# Patient Record
Sex: MALE | Race: WHITE | NOT HISPANIC OR LATINO | Employment: FULL TIME | URBAN - METROPOLITAN AREA
[De-identification: names, ages, dates, MRNs, and addresses within clinical notes are randomized per-mention and may not be internally consistent; named-entity substitution may affect disease eponyms.]

---

## 2022-05-09 ENCOUNTER — OFFICE VISIT (OUTPATIENT)
Dept: FAMILY MEDICINE CLINIC | Facility: CLINIC | Age: 27
End: 2022-05-09

## 2022-05-09 ENCOUNTER — LAB (OUTPATIENT)
Dept: LAB | Facility: HOSPITAL | Age: 27
End: 2022-05-09

## 2022-05-09 VITALS
SYSTOLIC BLOOD PRESSURE: 111 MMHG | TEMPERATURE: 98 F | HEART RATE: 65 BPM | HEIGHT: 70 IN | BODY MASS INDEX: 30.24 KG/M2 | DIASTOLIC BLOOD PRESSURE: 70 MMHG | OXYGEN SATURATION: 98 % | WEIGHT: 211.2 LBS

## 2022-05-09 DIAGNOSIS — Z13.6 ENCOUNTER FOR LIPID SCREENING FOR CARDIOVASCULAR DISEASE: ICD-10-CM

## 2022-05-09 DIAGNOSIS — Z11.59 NEED FOR HEPATITIS C SCREENING TEST: ICD-10-CM

## 2022-05-09 DIAGNOSIS — Z13.220 ENCOUNTER FOR LIPID SCREENING FOR CARDIOVASCULAR DISEASE: ICD-10-CM

## 2022-05-09 DIAGNOSIS — Z76.89 ESTABLISHING CARE WITH NEW DOCTOR, ENCOUNTER FOR: Primary | ICD-10-CM

## 2022-05-09 DIAGNOSIS — Z76.89 ESTABLISHING CARE WITH NEW DOCTOR, ENCOUNTER FOR: ICD-10-CM

## 2022-05-09 DIAGNOSIS — Z13.29 SCREENING FOR THYROID DISORDER: ICD-10-CM

## 2022-05-09 DIAGNOSIS — F41.9 ANXIETY: ICD-10-CM

## 2022-05-09 DIAGNOSIS — R07.9 CHEST PAIN, UNSPECIFIED TYPE: ICD-10-CM

## 2022-05-09 DIAGNOSIS — F32.A DEPRESSION, UNSPECIFIED DEPRESSION TYPE: ICD-10-CM

## 2022-05-09 LAB
ALBUMIN SERPL-MCNC: 5 G/DL (ref 3.5–5.2)
ALBUMIN/GLOB SERPL: 2.3 G/DL
ALP SERPL-CCNC: 86 U/L (ref 39–117)
ALT SERPL W P-5'-P-CCNC: 48 U/L (ref 1–41)
ANION GAP SERPL CALCULATED.3IONS-SCNC: 10.8 MMOL/L (ref 5–15)
AST SERPL-CCNC: 25 U/L (ref 1–40)
BASOPHILS # BLD AUTO: 0.04 10*3/MM3 (ref 0–0.2)
BASOPHILS NFR BLD AUTO: 0.9 % (ref 0–1.5)
BILIRUB SERPL-MCNC: 0.7 MG/DL (ref 0–1.2)
BUN SERPL-MCNC: 11 MG/DL (ref 6–20)
BUN/CREAT SERPL: 12 (ref 7–25)
CALCIUM SPEC-SCNC: 10 MG/DL (ref 8.6–10.5)
CHLORIDE SERPL-SCNC: 104 MMOL/L (ref 98–107)
CHOLEST SERPL-MCNC: 172 MG/DL (ref 0–200)
CO2 SERPL-SCNC: 25.2 MMOL/L (ref 22–29)
CREAT SERPL-MCNC: 0.92 MG/DL (ref 0.76–1.27)
DEPRECATED RDW RBC AUTO: 42 FL (ref 37–54)
EGFRCR SERPLBLD CKD-EPI 2021: 117.7 ML/MIN/1.73
EOSINOPHIL # BLD AUTO: 0.09 10*3/MM3 (ref 0–0.4)
EOSINOPHIL NFR BLD AUTO: 2 % (ref 0.3–6.2)
ERYTHROCYTE [DISTWIDTH] IN BLOOD BY AUTOMATED COUNT: 13 % (ref 12.3–15.4)
GLOBULIN UR ELPH-MCNC: 2.2 GM/DL
GLUCOSE SERPL-MCNC: 95 MG/DL (ref 65–99)
HCT VFR BLD AUTO: 49.6 % (ref 37.5–51)
HCV AB SER DONR QL: NORMAL
HDLC SERPL-MCNC: 39 MG/DL (ref 40–60)
HGB BLD-MCNC: 17.2 G/DL (ref 13–17.7)
IMM GRANULOCYTES # BLD AUTO: 0.01 10*3/MM3 (ref 0–0.05)
IMM GRANULOCYTES NFR BLD AUTO: 0.2 % (ref 0–0.5)
LDLC SERPL CALC-MCNC: 110 MG/DL (ref 0–100)
LDLC/HDLC SERPL: 2.75 {RATIO}
LYMPHOCYTES # BLD AUTO: 1.47 10*3/MM3 (ref 0.7–3.1)
LYMPHOCYTES NFR BLD AUTO: 32.4 % (ref 19.6–45.3)
MCH RBC QN AUTO: 30.7 PG (ref 26.6–33)
MCHC RBC AUTO-ENTMCNC: 34.7 G/DL (ref 31.5–35.7)
MCV RBC AUTO: 88.6 FL (ref 79–97)
MONOCYTES # BLD AUTO: 0.41 10*3/MM3 (ref 0.1–0.9)
MONOCYTES NFR BLD AUTO: 9 % (ref 5–12)
NEUTROPHILS NFR BLD AUTO: 2.52 10*3/MM3 (ref 1.7–7)
NEUTROPHILS NFR BLD AUTO: 55.5 % (ref 42.7–76)
NRBC BLD AUTO-RTO: 0 /100 WBC (ref 0–0.2)
PLATELET # BLD AUTO: 252 10*3/MM3 (ref 140–450)
PMV BLD AUTO: 10.9 FL (ref 6–12)
POTASSIUM SERPL-SCNC: 4.7 MMOL/L (ref 3.5–5.2)
PROT SERPL-MCNC: 7.2 G/DL (ref 6–8.5)
RBC # BLD AUTO: 5.6 10*6/MM3 (ref 4.14–5.8)
SODIUM SERPL-SCNC: 140 MMOL/L (ref 136–145)
TRIGL SERPL-MCNC: 129 MG/DL (ref 0–150)
TSH SERPL DL<=0.05 MIU/L-ACNC: 2.42 UIU/ML (ref 0.27–4.2)
VLDLC SERPL-MCNC: 23 MG/DL (ref 5–40)
WBC NRBC COR # BLD: 4.54 10*3/MM3 (ref 3.4–10.8)

## 2022-05-09 PROCEDURE — 86803 HEPATITIS C AB TEST: CPT

## 2022-05-09 PROCEDURE — 93000 ELECTROCARDIOGRAM COMPLETE: CPT | Performed by: NURSE PRACTITIONER

## 2022-05-09 PROCEDURE — 80061 LIPID PANEL: CPT

## 2022-05-09 PROCEDURE — 99204 OFFICE O/P NEW MOD 45 MIN: CPT | Performed by: NURSE PRACTITIONER

## 2022-05-09 PROCEDURE — 80050 GENERAL HEALTH PANEL: CPT

## 2022-05-09 PROCEDURE — 36415 COLL VENOUS BLD VENIPUNCTURE: CPT

## 2022-05-09 RX ORDER — SERTRALINE HYDROCHLORIDE 25 MG/1
25 TABLET, FILM COATED ORAL DAILY
Qty: 30 TABLET | Refills: 1 | Status: SHIPPED | OUTPATIENT
Start: 2022-05-09 | End: 2022-10-19 | Stop reason: DRUGHIGH
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
## 2022-05-23 ENCOUNTER — APPOINTMENT (OUTPATIENT)
Dept: CARDIOLOGY | Facility: HOSPITAL | Age: 27
End: 2022-05-23

## 2022-05-24 ENCOUNTER — OFFICE VISIT (OUTPATIENT)
Dept: FAMILY MEDICINE CLINIC | Facility: CLINIC | Age: 27
End: 2022-05-24

## 2022-05-24 ENCOUNTER — HOSPITAL ENCOUNTER (OUTPATIENT)
Dept: GENERAL RADIOLOGY | Facility: HOSPITAL | Age: 27
Discharge: HOME | End: 2022-05-24
Admitting: NURSE PRACTITIONER

## 2022-05-24 VITALS
HEIGHT: 70 IN | SYSTOLIC BLOOD PRESSURE: 135 MMHG | DIASTOLIC BLOOD PRESSURE: 74 MMHG | WEIGHT: 208 LBS | OXYGEN SATURATION: 99 % | HEART RATE: 84 BPM | BODY MASS INDEX: 29.78 KG/M2

## 2022-05-24 DIAGNOSIS — M54.50 MIDLINE LOW BACK PAIN WITHOUT SCIATICA, UNSPECIFIED CHRONICITY: ICD-10-CM

## 2022-05-24 DIAGNOSIS — M54.50 MIDLINE LOW BACK PAIN WITHOUT SCIATICA, UNSPECIFIED CHRONICITY: Primary | ICD-10-CM

## 2022-05-24 PROCEDURE — 72110 X-RAY EXAM L-2 SPINE 4/>VWS: CPT

## 2022-05-24 PROCEDURE — 99213 OFFICE O/P EST LOW 20 MIN: CPT | Performed by: NURSE PRACTITIONER

## 2022-05-24 PROCEDURE — 96372 THER/PROPH/DIAG INJ SC/IM: CPT | Performed by: NURSE PRACTITIONER

## 2022-05-24 RX ORDER — MELOXICAM 15 MG/1
15 TABLET ORAL DAILY
Qty: 30 TABLET | Refills: 0 | Status: SHIPPED | OUTPATIENT
Start: 2022-05-24 | End: 2022-09-26

## 2022-05-24 RX ORDER — KETOROLAC TROMETHAMINE 30 MG/ML
60 INJECTION, SOLUTION INTRAMUSCULAR; INTRAVENOUS ONCE
Status: COMPLETED | OUTPATIENT
Start: 2022-05-24 | End: 2022-05-24

## 2022-05-24 RX ORDER — TRIAMCINOLONE ACETONIDE 40 MG/ML
40 INJECTION, SUSPENSION INTRA-ARTICULAR; INTRAMUSCULAR ONCE
Status: COMPLETED | OUTPATIENT
Start: 2022-05-24 | End: 2022-05-24

## 2022-05-24 RX ADMIN — TRIAMCINOLONE ACETONIDE 40 MG: 40 INJECTION, SUSPENSION INTRA-ARTICULAR; INTRAMUSCULAR at 09:44

## 2022-05-24 RX ADMIN — KETOROLAC TROMETHAMINE 60 MG: 30 INJECTION, SOLUTION INTRAMUSCULAR; INTRAVENOUS at 09:45

## 2022-05-24 NOTE — PROGRESS NOTES
"Chief Complaint  Low Back Pain    Subjective          Michael Horowitz presents to [facility]  Back Pain  This is a recurrent problem. The current episode started in the past 7 days. The problem occurs constantly. The problem is unchanged. The pain is present in the lumbar spine. The quality of the pain is described as burning and aching. The pain does not radiate. The pain is at a severity of 8/10. The pain is the same all the time. The symptoms are aggravated by sitting, standing and bending. He has tried analgesics and muscle relaxant for the symptoms. The treatment provided no relief.       Objective   Vital Signs:  /74   Pulse 84   Ht 177.8 cm (70\")   Wt 94.3 kg (208 lb)   SpO2 99%   BMI 29.84 kg/m²   BMI is >= 25 and < 30. (Overweight) The following options were offered after discussion: nutrition counseling/recommendations    m  Physical Exam  Vitals reviewed.   Constitutional:       Appearance: Normal appearance. He is well-developed.   HENT:      Head: Normocephalic and atraumatic.      Right Ear: External ear normal.      Left Ear: External ear normal.      Mouth/Throat:      Pharynx: No oropharyngeal exudate.   Eyes:      Conjunctiva/sclera: Conjunctivae normal.      Pupils: Pupils are equal, round, and reactive to light.   Cardiovascular:      Rate and Rhythm: Normal rate and regular rhythm.      Heart sounds: No murmur heard.    No friction rub. No gallop.   Pulmonary:      Effort: Pulmonary effort is normal.      Breath sounds: Normal breath sounds. No wheezing or rhonchi.   Musculoskeletal:      Lumbar back: Tenderness and bony tenderness present. Positive right straight leg raise test and positive left straight leg raise test.   Skin:     General: Skin is warm and dry.   Neurological:      Mental Status: He is alert and oriented to person, place, and time.      Cranial Nerves: No cranial nerve deficit.   Psychiatric:         Mood and Affect: Mood and affect " normal.         Behavior: Behavior normal.         Thought Content: Thought content normal.         Judgment: Judgment normal.        Result Review :                 Assessment and Plan    Diagnoses and all orders for this visit:    1. Midline low back pain without sciatica, unspecified chronicity (Primary)  Comments:  trial mobic 15 mg daily, side effects and administration addressed, no additional NSAIDS  Orders:  -     meloxicam (MOBIC) 15 MG tablet; Take 1 tablet by mouth Daily.  Dispense: 30 tablet; Refill: 0  -     triamcinolone acetonide (KENALOG-40) injection 40 mg  -     ketorolac (TORADOL) injection 60 mg  -     XR Spine Lumbar 4+ View; Future             Follow Up   Return if symptoms worsen or fail to improve.  Patient was given instructions and counseling regarding his condition or for health maintenance advice. Please see specific information pulled into the AVS if appropriate.

## 2022-06-02 ENCOUNTER — TRANSCRIBE ORDERS (OUTPATIENT)
Dept: PHYSICAL THERAPY | Facility: CLINIC | Age: 27
End: 2022-06-02

## 2022-06-02 DIAGNOSIS — S39.012A STRAIN OF LUMBAR REGION, INITIAL ENCOUNTER: Primary | ICD-10-CM

## 2022-06-03 ENCOUNTER — TREATMENT (OUTPATIENT)
Dept: PHYSICAL THERAPY | Facility: CLINIC | Age: 27
End: 2022-06-03

## 2022-06-03 DIAGNOSIS — M54.50 LOW BACK PAIN, UNSPECIFIED BACK PAIN LATERALITY, UNSPECIFIED CHRONICITY, UNSPECIFIED WHETHER SCIATICA PRESENT: Primary | ICD-10-CM

## 2022-06-03 PROCEDURE — 97110 THERAPEUTIC EXERCISES: CPT | Performed by: PHYSICAL THERAPIST

## 2022-06-03 PROCEDURE — 97161 PT EVAL LOW COMPLEX 20 MIN: CPT | Performed by: PHYSICAL THERAPIST

## 2022-06-03 NOTE — PROGRESS NOTES
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 He was bent over and twisting. He felt immediate pain, difficulty moving, had pain running down his left leg into his shin/calf. He is moving better now overall, but still has the stiffness and soreness in both sides of his lower back and mainly the left hip. Has tried heat, muscle relaxers, but neither help. 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      Precautions and Work Restrictions: currently on light dutyPain  Current pain ratin  At best pain ratin  At worst pain ratin  Quality: radiating, tight and dull ache  Relieving factors: change in position  Aggravating factors: prolonged positioning and repetitive movement    Social Support  Lives in: multiple-level home  Lives with: young children    Diagnostic Tests  X-ray: normal    Patient Goals  Patient goals for therapy: decreased pain, increased strength, return to sport/leisure activities and increased motion             Objective          Neurological Testing     Sensation     Lumbar   Left   Intact: light touch    Right   Intact: light touch    Active Range of Motion     Lumbar   Flexion: 90 degrees   Extension: 15 degrees   Left lateral flexion: 25 degrees   Right lateral flexion: 25 degrees     Strength/Myotome  Testing     Lumbar     Right   Normal strength    Left Hip   Planes of Motion   Flexion: 4+  Extension: 4  Abduction: 4  Adduction: 5    Tests       Thoracic   Negative slump.     Lumbar     Left   Negative crossed SLR.     Right   Negative crossed SLR.     Additional Tests Details  Repeated extension - stiffness, no changes in LE symptoms (inconclusive)    Repeated flexion - no change in LE symptoms (inconclusive)      See Exercise, Manual, and Modality Logs for complete treatment.     Assessment & Plan     Assessment  Impairments: abnormal muscle firing, abnormal or restricted ROM, activity intolerance, impaired physical strength and pain with function  Functional Limitations: carrying objects, lifting, walking, uncomfortable because of pain, sitting and standing  Assessment details: The patient presents to physical therapy with complaints of low back pain. The patient presents with associated left lower extremity weakness, lumbar stiffness, and functional deficits (OSWESTRY). The patient would benefit from skilled PT intervention to address the above mentioned functional limitations. Inconclusive with repeated movement testing.     Prognosis: good    Goals  Plan Goals: LOW BACK PROBLEMS:    1. The patient complains of low back pain.  LTG 1: 12 weeks:  The patient will report a pain rating of 3/10 or better at worst in order to improve  tolerance to activities of daily living and improve sleep quality.  STATUS:  New  STG 1a: 6 weeks:  The patient will report a pain rating of 5/10 or better at its worst.  STATUS:  New  TREATMENT:  Therapeutic exercises, manual therapy, aquatic therapy, home exercise   instruction, and modalities as needed for pain to include:  electrical stimulation, moist heat, ice,   ultrasound, and diathermy.      2. The patient demonstrates weakness of the left hip.  LTG 2: 12 weeks:  The patient will demonstrate 5 /5 strength for left hip flexion, abduction,  and extension in order to improve  hip stability.  STATUS:  New  STG 2a: 6 weeks:  The patient will demonstrate 4+ /5 strength for left hip flexion, abduction,  and extension.  STATUS:  New  TREATMENT: Therapeutic exercises, manual therapy, aquatic therapy, home exercise instruction,  and modalities as needed for pain to include:  electrical stimulation, moist heat, ice, ultrasound, and   diathermy.      4. The patient has limited lumbar AROM  LTG 4: 12 weeks:  The patient will demonstrate lumbar AROM as follows: 25 degrees of extension.  STATUS:  New  STG 4a: The patient will be independent with HEP.    STATUS:  New  TREATMENT: Manual therapy, therapeutic exercise, home exercise instruction, and modalities as needed to include: moist heat, electrical stimulation, and ultrasound.      5. Mobility: Walking/Moving Around Functional Limitation    LTG 5: 12 weeks:  The patient will demonstrate 1-19 % limitation by achieving a score of 1-9 on the DONTRELL.  STATUS:  New  TREATMENT:  Manual therapy, therapeutic exercise, home exercise instruction, and modalities as needed to include: moist heat, electrical stimulation, and ultrasound.         Plan  Therapy options: will be seen for skilled therapy services  Planned modality interventions: TENS, cryotherapy, thermotherapy (hydrocollator packs), traction and dry needling  Planned therapy interventions: manual therapy, stretching, strengthening, therapeutic activities, neuromuscular re-education, home exercise program, joint mobilization, functional ROM exercises, soft tissue mobilization, spinal/joint mobilization, flexibility and gait training  Frequency: 3x week  Duration in weeks: 12  Treatment plan discussed with: patient        Visit Diagnoses:    ICD-10-CM ICD-9-CM   1. Low back pain, unspecified back pain laterality, unspecified chronicity, unspecified whether sciatica present  M54.50 724.2       History # of Personal Factors and/or Comorbidities: LOW (0)  Examination of Body System(s): # of elements:  MODERATE (3)  Clinical Presentation: STABLE   Clinical Decision Making: LOW       Timed:         Manual Therapy:    0     mins  75021;     Therapeutic Exercise:    15     mins  25091;     Neuromuscular Jose Luis:    0    mins  23057;    Therapeutic Activity:     0     mins  64195;     Gait Trainin     mins  27516;     Ultrasound:     0     mins  94181;    Ionto                               0    mins   96901  Self Care                       0     mins   70086  Canalith Repos    0     mins 28986      Un-Timed:  Electrical Stimulation:    0     mins  43760 (MC );  Dry Needling     0     mins self-pay  Traction     0     mins 27037  Low Eval     30     Mins  09047  Mod Eval     0     Mins  50193  High Eval                       0     Mins  82204  Re-Eval                           0    mins  75672    Timed Treatment:   15   mins   Total Treatment:     45   mins    PT SIGNATURE: Nader Whitten PT     Electronically signed 6/3/2022    [place] License: PT - [id number]     Initial Certification  Certification Period: 6/3/2022 thru 2022  I certify that the therapy services are furnished while this patient is under my care.  The services outlined above are required by this patient, and will be reviewed every 90 days.     PHYSICIAN: Duong Bazan MD   NPI: [id number]                                        DATE:     Please sign and return via fax to [phone]. Thank you, [facility].

## 2022-06-21 ENCOUNTER — TREATMENT (OUTPATIENT)
Dept: PHYSICAL THERAPY | Facility: CLINIC | Age: 27
End: 2022-06-21

## 2022-06-21 DIAGNOSIS — M54.50 LOW BACK PAIN, UNSPECIFIED BACK PAIN LATERALITY, UNSPECIFIED CHRONICITY, UNSPECIFIED WHETHER SCIATICA PRESENT: Primary | ICD-10-CM

## 2022-06-21 PROCEDURE — 97110 THERAPEUTIC EXERCISES: CPT | Performed by: PHYSICAL THERAPIST

## 2022-06-21 PROCEDURE — 97140 MANUAL THERAPY 1/> REGIONS: CPT | Performed by: PHYSICAL THERAPIST

## 2022-06-21 NOTE — PROGRESS NOTES
Physical Therapy Daily Treatment Note      Patient: Michael Horowitz   : 1995  Referring practitioner: Duong Bazan MD  Date of Initial Visit: Type: THERAPY  Noted: 6/3/2022  Today's Date: 2022  Patient seen for 2 sessions           Subjective  Michael Horowitz reports: his pain rates 6 /10 at start of care. Michael indicated his pain is in the back and extends into R anterolateral thigh.        Objective   See Exercise, Manual, and Modality Logs for complete treatment.       Assessment/Plan  Spontaneous lumbar mobilization occurred during L ilium distraction while R side lying. Michael noted that he felt this and it did change his lower back discomfort but not the referred sensation in his anterolateral R thigh. No change in pain rating after session. Skilled care remains necessary to aid pain relief, aid flexibility and strength to return to PLOF.    Visit Diagnoses:    ICD-10-CM ICD-9-CM   1. Low back pain, unspecified back pain laterality, unspecified chronicity, unspecified whether sciatica present  M54.50 724.2       Progress per Plan of Care and Progress strengthening /stabilization /functional activity           Timed:  Manual Therapy:    18     mins  76195;  Therapeutic Exercise:     9    mins  01724;     Neuromuscular Jose Luis:        mins  25353;    Therapeutic Activity:          mins  93814;     Gait Training:           mins  81531;     Ultrasound:          mins  30723;    Electrical Stimulation:         mins  17283 ( );  Aquatics  __   mins   89624    Untimed:  Electrical Stimulation:         mins  97209 ( );  Mechanical Traction:         mins  43475;     Timed Treatment:   27   mins   Total Treatment:     27   mins    Electronically Signed:  Elizabeth Dupree PTA  Physical Therapist Assistant    [place] \[facility]\"" license [id number]

## 2022-06-22 DIAGNOSIS — F32.A DEPRESSION, UNSPECIFIED DEPRESSION TYPE: ICD-10-CM

## 2022-06-22 DIAGNOSIS — F41.9 ANXIETY: ICD-10-CM

## 2022-06-23 ENCOUNTER — TREATMENT (OUTPATIENT)
Dept: PHYSICAL THERAPY | Facility: CLINIC | Age: 27
End: 2022-06-23

## 2022-06-23 DIAGNOSIS — M54.50 LOW BACK PAIN, UNSPECIFIED BACK PAIN LATERALITY, UNSPECIFIED CHRONICITY, UNSPECIFIED WHETHER SCIATICA PRESENT: Primary | ICD-10-CM

## 2022-06-23 PROCEDURE — 97110 THERAPEUTIC EXERCISES: CPT | Performed by: PHYSICAL THERAPIST

## 2022-06-23 PROCEDURE — 97140 MANUAL THERAPY 1/> REGIONS: CPT | Performed by: PHYSICAL THERAPIST

## 2022-06-23 RX ORDER — SERTRALINE HYDROCHLORIDE 25 MG/1
25 TABLET, FILM COATED ORAL DAILY
Qty: 30 TABLET | Refills: 1 | OUTPATIENT
Start: 2022-06-23

## 2022-06-23 NOTE — PROGRESS NOTES
"Physical Therapy Daily Treatment Note      Patient: Michael Horowitz   : 1995  Referring practitioner: Duong Bazan MD  Date of Initial Visit: Type: THERAPY  Noted: 6/3/2022  Today's Date: 2022  Patient seen for 3 sessions           Subjective  Michael Horowitz reports: paih at 6/10 at arrival. Michael commented that he is feeling an ache that he indicated from L SI down buttocks into lateral thigh down to fibular head now. \"It feels like the leg is numb, it is inside, don't know how to explain it.\"     \"Have my MRI next Wednesday.\" The date of MRI is 22.    Objective   See Exercise, Manual, and Modality Logs for complete treatment.       Assessment/Plan  Varied relief and then increase in tightness at low back reported during manual work. Then during prone extension he c/o worsening numbness so this was ceased. Further skilled care required to attend to AROM, strength, and functional gains.    Visit Diagnoses:    ICD-10-CM ICD-9-CM   1. Low back pain, unspecified back pain laterality, unspecified chronicity, unspecified whether sciatica present  M54.50 724.2       Progress per Plan of Care and Progress strengthening /stabilization /functional activity           Timed:  Manual Therapy:    24     mins  63261;  Therapeutic Exercise:    8     mins  63582;     Neuromuscular Jose Luis:        mins  98791;    Therapeutic Activity:          mins  34345;     Gait Training:           mins  38383;     Ultrasound:          mins  73167;    Electrical Stimulation:         mins  85432 ( );  Aquatics  __   mins   65542    Untimed:  Electrical Stimulation:         mins  31774 ( );  Mechanical Traction:         mins  91370;     Timed Treatment:   32   mins   Total Treatment:     32   mins    Electronically Signed:  Elizabeth Dupree PTA  Physical Therapist Assistant    [place] PTA license [id number]            "

## 2022-06-28 ENCOUNTER — TREATMENT (OUTPATIENT)
Dept: PHYSICAL THERAPY | Facility: CLINIC | Age: 27
End: 2022-06-28

## 2022-06-28 DIAGNOSIS — M54.50 LOW BACK PAIN, UNSPECIFIED BACK PAIN LATERALITY, UNSPECIFIED CHRONICITY, UNSPECIFIED WHETHER SCIATICA PRESENT: Primary | ICD-10-CM

## 2022-06-28 PROCEDURE — 97110 THERAPEUTIC EXERCISES: CPT | Performed by: PHYSICAL THERAPIST

## 2022-06-28 PROCEDURE — 97140 MANUAL THERAPY 1/> REGIONS: CPT | Performed by: PHYSICAL THERAPIST

## 2022-06-28 NOTE — PROGRESS NOTES
Physical Therapy Daily Treatment Note      Patient: Michael Horowitz   : 1995  Referring practitioner: Duong Bazan MD  Date of Initial Visit: Type: THERAPY  Noted: 6/3/2022  Today's Date: 2022  Patient seen for 4 sessions           Subjective Questionnaire:       Subjective Evaluation    History of Present Illness    Subjective comment: Pt reported 6/10 pain and is moving better than he did initially.  Pt reported he is having trouble sleeping secondary to pain.Pain  Current pain ratin           Objective   See Exercise, Manual, and Modality Logs for complete treatment.       Assessment & Plan     Assessment    Assessment details: Pt tolerated therapeutic exercises well and reported slight relief with manual.  Continue with POC to return pt to regular duty at work without back pain.        Visit Diagnoses:    ICD-10-CM ICD-9-CM   1. Low back pain, unspecified back pain laterality, unspecified chronicity, unspecified whether sciatica present  M54.50 724.2       Progress per Plan of Care and Progress strengthening /stabilization /functional activity           Timed:  Manual Therapy:    10     mins  35975;  Therapeutic Exercise:    19     mins  21770;     Neuromuscular Jose Luis:        mins  32425;    Therapeutic Activity:          mins  33596;     Gait Training:           mins  68789;     Ultrasound:          mins  59628;    Electrical Stimulation:         mins  20015 ( );  Aquatic Therapy          mins  81315    Untimed:  Electrical Stimulation:         mins  72856 ( );  Mechanical Traction:         mins  88426;     Timed Treatment:   29   mins   Total Treatment:     29   mins    Electronically signed    Lyly Mckeon PTA  Physical Therapist Assistant    [facility] license: [id number]

## 2022-07-01 ENCOUNTER — TREATMENT (OUTPATIENT)
Dept: PHYSICAL THERAPY | Facility: CLINIC | Age: 27
End: 2022-07-01

## 2022-07-01 DIAGNOSIS — M54.50 LOW BACK PAIN, UNSPECIFIED BACK PAIN LATERALITY, UNSPECIFIED CHRONICITY, UNSPECIFIED WHETHER SCIATICA PRESENT: Primary | ICD-10-CM

## 2022-07-01 PROCEDURE — 97140 MANUAL THERAPY 1/> REGIONS: CPT | Performed by: PHYSICAL THERAPIST

## 2022-07-01 PROCEDURE — 97110 THERAPEUTIC EXERCISES: CPT | Performed by: PHYSICAL THERAPIST

## 2022-07-01 NOTE — PROGRESS NOTES
"Progress Assessment        Patient: Michael Horowitz   : 1995  Diagnosis/ICD-10 Code:  Low back pain, unspecified back pain laterality, unspecified chronicity, unspecified whether sciatica present [M54.50]  Referring practitioner: Duong Bazan MD  Date of Initial Visit: Type: THERAPY  Noted: 6/3/2022  Today's Date: 2022  Patient seen for 5 sessions      Subjective:     Subjective Questionnaire: Oswestry:  = 40% limitation  Clinical Progress: improved  Home Program Compliance: Yes  Treatment has included: therapeutic exercise, manual therapy and therapeutic activity    Subjective Evaluation    History of Present Illness  Mechanism of injury: Pt reporting he is \"a little better\" but still having that same pain on the left side of the lower back. And he is having more pain shooting down the leg, to the knee, and into the bottom of the foot and also numbness in the foot. He had an MRI earlier this week, but doesn't know the results yet, he has a follow up with his physician later today.    Pain  Current pain ratin  At worst pain ratin  Quality: radiating         Objective     Neurological Testing      Sensation      Lumbar   Left   Intact: light touch     Right   Intact: light touch     Active Range of Motion      Lumbar   Flexion: 60 degrees *pain left side  Extension: 25 degrees *pain left side  Left lateral flexion: 24 degrees *pain left side  Right lateral flexion: 30 degrees      Strength/Myotome Testing      Lumbar      Right   Normal strength     Left Hip   Planes of Motion   Flexion: 4+  Extension: 4+  Abduction: 4+  Adduction: 4     Tests       Positive Slump test on left with low back pain reported          Assessment & Plan     Assessment  Impairments: abnormal muscle firing, abnormal or restricted ROM, activity intolerance, impaired physical strength and pain with function  Functional Limitations: carrying objects, lifting, walking, uncomfortable because of pain, sitting and " standing  Assessment details: The patient presents to physical therapy with complaints of low back pain, he is still experiencing pain and symptoms radiating down the left leg. He has improved his extension ROM, however, more limited with flexion. He did have an MRI earlier this week which may give more indication of his injury. The patient presents with associated left lower extremity weakness, lumbar stiffness, and functional deficits (OSWESTRY). The patient would benefit from ongoing skilled PT intervention to address the above mentioned functional limitations. Inconclusive with repeated movement testing.     Prognosis: good    Goals  Plan Goals: LOW BACK PROBLEMS:    1. The patient complains of low back pain.  LTG 1: 12 weeks:  The patient will report a pain rating of 3/10 or better at worst in order to improve  tolerance to activities of daily living and improve sleep quality.  STATUS:  Not met  STG 1a: 6 weeks:  The patient will report a pain rating of 5/10 or better at its worst.  STATUS:  Not met  TREATMENT:  Therapeutic exercises, manual therapy, aquatic therapy, home exercise   instruction, and modalities as needed for pain to include:  electrical stimulation, moist heat, ice,   ultrasound, and diathermy.      2. The patient demonstrates weakness of the left hip.  LTG 2: 12 weeks:  The patient will demonstrate 5 /5 strength for left hip flexion, abduction,  and extension in order to improve hip stability.  STATUS:  Not met  STG 2a: 6 weeks:  The patient will demonstrate 4+ /5 strength for left hip flexion, abduction,  and extension.  STATUS:  Not met  TREATMENT: Therapeutic exercises, manual therapy, aquatic therapy, home exercise instruction,  and modalities as needed for pain to include:  electrical stimulation, moist heat, ice, ultrasound, and   diathermy.      4. The patient has limited lumbar AROM  LTG 4: 12 weeks:  The patient will demonstrate lumbar AROM as follows: 25 degrees of extension.  STATUS:  MET  STG 4a: The patient will be independent with HEP.    STATUS:  Ongoing  TREATMENT: Manual therapy, therapeutic exercise, home exercise instruction, and modalities as needed to include: moist heat, electrical stimulation, and ultrasound.      5. Mobility: Walking/Moving Around Functional Limitation    LTG 5: 12 weeks:  The patient will demonstrate 1-19 % limitation by achieving a score of 1-9 on the DONTRELL.  STATUS:  New  TREATMENT:  Manual therapy, therapeutic exercise, home exercise instruction, and modalities as needed to include: moist heat, electrical stimulation, and ultrasound.         Plan  Therapy options: will be seen for skilled therapy services  Planned modality interventions: TENS, cryotherapy, thermotherapy (hydrocollator packs), traction and dry needling  Planned therapy interventions: manual therapy, stretching, strengthening, therapeutic activities, neuromuscular re-education, home exercise program, joint mobilization, functional ROM exercises, soft tissue mobilization, spinal/joint mobilization, flexibility and gait training  Frequency: 3x week  Duration in weeks: 12  Treatment plan discussed with: patient      Progress toward previous goals: Partially Met    See Exercise, Manual, and Modality Logs for complete treatment.         Recommendations: Continue as planned  Timeframe: 2 months  Prognosis to achieve goals: good    PT Signature: Nader Whitten PT    Electronically signed 7/1/2022    [place] License: PT - [id number]     Based upon review of the patient's progress and continued therapy plan, it is my medical opinion that Michael Horowitz should continue physical therapy treatment at [facility]  [address]  [place] [address] [phone].      Timed:         Manual Therapy:    24     mins  15843;     Therapeutic Exercise:    10     mins  52978;     Neuromuscular Jose Luis:    0    mins  19547;    Therapeutic Activity:     0     mins  17984;     Gait  Trainin     mins  06521;     Ultrasound:     0     mins  16579;    Ionto                               0    mins   71861  Self Care                       0     mins   28060  Aquatic                          0     mins 23258            Timed Treatment:   34   mins   Total Treatment:     34   mins      I certify that the therapy services are furnished while this patient is under my care.  The services outlined above are required by this patient, and will be reviewed every 90 days.

## 2022-07-05 ENCOUNTER — TREATMENT (OUTPATIENT)
Dept: PHYSICAL THERAPY | Facility: CLINIC | Age: 27
End: 2022-07-05

## 2022-07-05 DIAGNOSIS — M54.50 LOW BACK PAIN, UNSPECIFIED BACK PAIN LATERALITY, UNSPECIFIED CHRONICITY, UNSPECIFIED WHETHER SCIATICA PRESENT: Primary | ICD-10-CM

## 2022-07-05 PROCEDURE — 97140 MANUAL THERAPY 1/> REGIONS: CPT | Performed by: PHYSICAL THERAPIST

## 2022-07-05 PROCEDURE — 97110 THERAPEUTIC EXERCISES: CPT | Performed by: PHYSICAL THERAPIST

## 2022-07-05 NOTE — PROGRESS NOTES
Physical Therapy Daily Treatment Note        Patient: Michael Horowitz   : 1995  Diagnosis/ICD-10 Code:  Low back pain, unspecified back pain laterality, unspecified chronicity, unspecified whether sciatica present [M54.50]  Referring practitioner: Duong Bazan MD  Date of Initial Visit: Type: THERAPY  Noted: 6/3/2022  Today's Date: 2022  Patient seen for 6 sessions             Subjective   Michael Horowitz reports: being sore today, states that the pain is mainly on the left side and can drop all the way down to his foot. States that he did have his MRI and they found a herniated disc.     Objective   Minimal discomfort with Lumbar Centeralized Posterior Anterior Mobs.     See Exercise, Manual, and Modality Logs for complete treatment.       Assessment/Plan  Michael still experiencing increased  back  pain, especially on the left side. Pt had some increased discomfort with Lumbar Centeralized Posterior Anterior Mobs. Pt would benefit from skilled PT to address Range of Motion  and Strength deficits, pain management and any concerns with ADLs.     Progress per Plan of Care           Timed:  Manual Therapy:    15     mins  62509;  Therapeutic Exercise:    15     mins  38570;     Neuromuscular Jose Luis:        mins  85010;    Therapeutic Activity:          mins  96308;     Gait Training:           mins  96494;    Aquatic Therapy:          mins  42673;       Untimed:  Electrical Stimulation:         mins  22366 ( );  Mechanical Traction:         mins  20406;       Timed Treatment:   30   mins   Total Treatment:     30   mins      Electronically signed:   Mariama Whitten PTA  Physical Therapist Assistant  [facility] License #: [id number]

## 2022-07-07 ENCOUNTER — TREATMENT (OUTPATIENT)
Dept: PHYSICAL THERAPY | Facility: CLINIC | Age: 27
End: 2022-07-07

## 2022-07-07 DIAGNOSIS — M54.50 LOW BACK PAIN, UNSPECIFIED BACK PAIN LATERALITY, UNSPECIFIED CHRONICITY, UNSPECIFIED WHETHER SCIATICA PRESENT: Primary | ICD-10-CM

## 2022-07-07 PROCEDURE — 97530 THERAPEUTIC ACTIVITIES: CPT | Performed by: PHYSICAL THERAPIST

## 2022-07-07 PROCEDURE — 97140 MANUAL THERAPY 1/> REGIONS: CPT | Performed by: PHYSICAL THERAPIST

## 2022-07-07 PROCEDURE — 97110 THERAPEUTIC EXERCISES: CPT | Performed by: PHYSICAL THERAPIST

## 2022-07-07 NOTE — PROGRESS NOTES
Physical Therapy Daily Treatment Note      Patient: Michael Horowitz   : 1995  Referring practitioner: Duong Bazan MD  Date of Initial Visit: Type: THERAPY  Noted: 6/3/2022  Today's Date: 2022  Patient seen for 7 sessions           Subjective Questionnaire:       Subjective Evaluation    History of Present Illness    Subjective comment: Pt reported he could feel pain in B knees today 7/10.  Pt reports [employer] has said they will refer to him to a specialist.Pain  Current pain ratin           Objective   See Exercise, Manual, and Modality Logs for complete treatment.       Assessment & Plan     Assessment    Assessment details: Pt presented to PT with c/o B knee pain and reported knee pain was better after strengthening exercise but back was a little sore.  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 Low back pain, unspecified back pain laterality, unspecified chronicity, unspecified whether sciatica present  M54.50 724.2       Progress per Plan of Care and Progress strengthening /stabilization /functional activity           Timed:  Manual Therapy:    8     mins  94784;  Therapeutic Exercise:    12     mins  36591;     Neuromuscular Jose Luis:        mins  57223;    Therapeutic Activity:     8     mins  21764;     Gait Training:           mins  81474;     Ultrasound:          mins  89600;    Electrical Stimulation:         mins  10531 ( );  Aquatic Therapy          mins  22235    Untimed:  Electrical Stimulation:         mins  66092 ( );  Mechanical Traction:         mins  19529;     Timed Treatment:   28   mins   Total Treatment:     28   mins    Electronically signed    Lyly Mckeon PTA  Physical Therapist Assistant    [facility] license: [id number]

## 2022-07-11 ENCOUNTER — TREATMENT (OUTPATIENT)
Dept: PHYSICAL THERAPY | Facility: CLINIC | Age: 27
End: 2022-07-11

## 2022-07-11 DIAGNOSIS — M54.50 LOW BACK PAIN, UNSPECIFIED BACK PAIN LATERALITY, UNSPECIFIED CHRONICITY, UNSPECIFIED WHETHER SCIATICA PRESENT: Primary | ICD-10-CM

## 2022-07-11 PROCEDURE — 97110 THERAPEUTIC EXERCISES: CPT | Performed by: PHYSICAL THERAPIST

## 2022-07-11 PROCEDURE — 97140 MANUAL THERAPY 1/> REGIONS: CPT | Performed by: PHYSICAL THERAPIST

## 2022-07-11 PROCEDURE — 97530 THERAPEUTIC ACTIVITIES: CPT | Performed by: PHYSICAL THERAPIST

## 2022-07-11 NOTE — PROGRESS NOTES
"Physical Therapy Daily Treatment Note      Patient: Michael Horowitz   : 1995  Referring practitioner: Duong Bazan MD  Date of Initial Visit: Type: THERAPY  Noted: 6/3/2022  Today's Date: 2022  Patient seen for 8 sessions           Subjective  Michael Horowitz reports: \"Tender today.\" \"still hard to lay flat on my back.\"       Objective   See Exercise, Manual, and Modality Logs for complete treatment.       Assessment/Plan     Michael is still waking up often due to pain. Soreness L sided after session \"but no numbness going down the leg\" Michael has scheduled one additional visit per authorized visits. He is to seek additional visits per physician order.     Visit Diagnoses:    ICD-10-CM ICD-9-CM   1. Low back pain, unspecified back pain laterality, unspecified chronicity, unspecified whether sciatica present  M54.50 724.2       Progress per Plan of Care and Progress strengthening /stabilization /functional activity           Timed:  Manual Therapy:   8      mins  93400;  Therapeutic Exercise:    13     mins  87617;     Neuromuscular Jose Luis:        mins  95817;    Therapeutic Activity:     10     mins  86651;     Gait Training:           mins  03409;     Ultrasound:          mins  18763;    Electrical Stimulation:         mins  21229 ( );  Aquatics  __   mins   55966    Untimed:  Electrical Stimulation:         mins  85689 ( );  Mechanical Traction:         mins  93605;     Timed Treatment:   31   mins   Total Treatment:     31   mins    Electronically Signed:  Elizabeth Dupree PTA  Physical Therapist Assistant    [place] PTA license [id number]            "

## 2022-07-13 ENCOUNTER — TREATMENT (OUTPATIENT)
Dept: PHYSICAL THERAPY | Facility: CLINIC | Age: 27
End: 2022-07-13

## 2022-07-13 DIAGNOSIS — M54.50 LOW BACK PAIN, UNSPECIFIED BACK PAIN LATERALITY, UNSPECIFIED CHRONICITY, UNSPECIFIED WHETHER SCIATICA PRESENT: Primary | ICD-10-CM

## 2022-07-13 PROCEDURE — 97112 NEUROMUSCULAR REEDUCATION: CPT | Performed by: PHYSICAL THERAPIST

## 2022-07-13 PROCEDURE — 97110 THERAPEUTIC EXERCISES: CPT | Performed by: PHYSICAL THERAPIST

## 2022-07-13 PROCEDURE — 97530 THERAPEUTIC ACTIVITIES: CPT | Performed by: PHYSICAL THERAPIST

## 2022-07-19 ENCOUNTER — TREATMENT (OUTPATIENT)
Dept: PHYSICAL THERAPY | Facility: CLINIC | Age: 27
End: 2022-07-19

## 2022-07-19 DIAGNOSIS — M54.50 LOW BACK PAIN, UNSPECIFIED BACK PAIN LATERALITY, UNSPECIFIED CHRONICITY, UNSPECIFIED WHETHER SCIATICA PRESENT: Primary | ICD-10-CM

## 2022-07-19 PROCEDURE — 97110 THERAPEUTIC EXERCISES: CPT | Performed by: PHYSICAL THERAPIST

## 2022-07-19 PROCEDURE — 97112 NEUROMUSCULAR REEDUCATION: CPT | Performed by: PHYSICAL THERAPIST

## 2022-07-19 PROCEDURE — 97140 MANUAL THERAPY 1/> REGIONS: CPT | Performed by: PHYSICAL THERAPIST

## 2022-07-19 NOTE — PROGRESS NOTES
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 States even having a little pain down his left leg again. Reports he was sore after last PT session but a loose kind of sore.     Objective   Increased discomfort with Unilateral Posterior Anterior Mobs to L Lumbar.     See Exercise, Manual, and Modality Logs for complete treatment.       Assessment/Plan  Michael still experiencing increased low back  pain, even pain down the leg again. Pt had some increased discomfort with Unilateral Posterior Anterior Mobs to L Lumbar. Pt would benefit from skilled PT to address Range of Motion  and Strength deficits, pain management and any concerns with ADLs.     Progress per Plan of Care           Timed:  Manual Therapy:  10       mins  13940;  Therapeutic Exercise:    10     mins  48750;     Neuromuscular Jose Luis:    10    mins  66177;    Therapeutic Activity:          mins  43197;     Gait Training:           mins  62095;    Aquatic Therapy:          mins  28717;       Untimed:  Electrical Stimulation:         mins  84227 ( );  Mechanical Traction:         mins  56525;       Timed Treatment:   30   mins   Total Treatment:     30   mins      Electronically signed:   Mariama Whitten PTA  Physical Therapist Assistant  [facility] License #: [id number]

## 2022-08-05 ENCOUNTER — OFFICE VISIT (OUTPATIENT)
Dept: NEUROSURGERY | Facility: CLINIC | Age: 27
End: 2022-08-05

## 2022-08-05 VITALS
WEIGHT: 218 LBS | HEIGHT: 70 IN | HEART RATE: 74 BPM | DIASTOLIC BLOOD PRESSURE: 75 MMHG | SYSTOLIC BLOOD PRESSURE: 119 MMHG | BODY MASS INDEX: 31.21 KG/M2

## 2022-08-05 DIAGNOSIS — M51.37 DDD (DEGENERATIVE DISC DISEASE), LUMBOSACRAL: ICD-10-CM

## 2022-08-05 DIAGNOSIS — M54.42 ACUTE MIDLINE LOW BACK PAIN WITH LEFT-SIDED SCIATICA: Primary | ICD-10-CM

## 2022-08-05 PROCEDURE — 99203 OFFICE O/P NEW LOW 30 MIN: CPT | Performed by: NEUROLOGICAL SURGERY

## 2022-08-05 RX ORDER — METHOCARBAMOL 750 MG/1
TABLET, FILM COATED ORAL
COMMUNITY
Start: 2022-06-03 | End: 2022-09-26

## 2022-08-05 NOTE — PROGRESS NOTES
"Chief Complaint  Back Pain and Leg Pain    Subjective          Michael Horowitz who is a 27 y.o. year old male who presents to [facility] for Evaluation of the Spine.     The patient complains of pain located in the lumbar spine.  Patients states the pain has been present for 3 months.  The pain came on acutely.  The pain scale level is 6.5/10.  The pain left leg and foot. The back pain is greater than the leg pain.  The pain is waxing/waning and described as aching.  The pain is worse at nighttime and awakens the patient at night. Patient states prolonged sitting and bending makes the pain worse.  Patient states nothing makes the pain better.    Associated Symptoms Include: Denies numbness and tingling  Conservative Interventions Include: NSAIDS, Muscle Relaxants, PT-none of which have helped much    Was this the result of an injury or accident?: Yes, Work Injury, hit suddenly after working on a main break    History of Previous Spinal Surgery?: No    This patient  reports that he has never smoked. He has never used smokeless tobacco.    Review of Systems   Musculoskeletal: Positive for back pain and myalgias.   Neurological: Positive for numbness (tingling).        Objective   Vital Signs:   /75   Pulse 74   Ht 177.8 cm (70\")   Wt 98.9 kg (218 lb)   BMI 31.28 kg/m²       Physical Exam  Constitutional:       Comments: BMI 31   Pulmonary:      Effort: Pulmonary effort is normal.   Musculoskeletal:         General: Tenderness (left lower lumbar/SI) present.   Neurological:      Mental Status: He is alert.      Sensory: No sensory deficit.      Motor: No weakness.      Deep Tendon Reflexes: Reflexes normal (2/4).   Psychiatric:         Mood and Affect: Mood normal.          Result Review :   I personally reviewed the patient's MRI scan which shows moderate DDD at L5-S1 with a broad disc bulge and mild disc bulge with mild left foraminal narrowing.       Assessment and " Plan    Diagnoses and all orders for this visit:    1. Acute midline low back pain with left-sided sciatica (Primary)    2. DDD (degenerative disc disease), lumbosacral  Comments:  Recent flare up     I would recommend he continue light for 2 months and he will f/u at that time.    He will work on his core strength and weight loss.    I have encouraged him to pursue lighter duty employment long term as he already has degenerative disc disease at 27.     He could discectomy/foraminotomy if the leg pain worsened. He could also consider LESB.    Follow Up   No follow-ups on file.  Patient was given instructions and counseling regarding his condition or for health maintenance advice. Please see specific information pulled into the AVS if appropriate.

## 2022-09-26 ENCOUNTER — HOSPITAL ENCOUNTER (OUTPATIENT)
Dept: GENERAL RADIOLOGY | Facility: HOSPITAL | Age: 27
Discharge: HOME | End: 2022-09-26
Admitting: NURSE PRACTITIONER

## 2022-09-26 ENCOUNTER — OFFICE VISIT (OUTPATIENT)
Dept: FAMILY MEDICINE CLINIC | Facility: CLINIC | Age: 27
End: 2022-09-26

## 2022-09-26 VITALS
TEMPERATURE: 98.2 F | OXYGEN SATURATION: 96 % | DIASTOLIC BLOOD PRESSURE: 70 MMHG | WEIGHT: 218.1 LBS | BODY MASS INDEX: 31.22 KG/M2 | HEIGHT: 70 IN | HEART RATE: 84 BPM | SYSTOLIC BLOOD PRESSURE: 112 MMHG

## 2022-09-26 DIAGNOSIS — R05.9 COUGH: ICD-10-CM

## 2022-09-26 DIAGNOSIS — F41.9 ANXIETY: ICD-10-CM

## 2022-09-26 DIAGNOSIS — R06.02 SHORTNESS OF BREATH: ICD-10-CM

## 2022-09-26 DIAGNOSIS — R09.89 CHEST CONGESTION: ICD-10-CM

## 2022-09-26 DIAGNOSIS — R05.9 COUGH: Primary | ICD-10-CM

## 2022-09-26 PROCEDURE — 71046 X-RAY EXAM CHEST 2 VIEWS: CPT

## 2022-09-26 PROCEDURE — 99213 OFFICE O/P EST LOW 20 MIN: CPT | Performed by: NURSE PRACTITIONER

## 2022-09-26 RX ORDER — BROMPHENIRAMINE MALEATE, PSEUDOEPHEDRINE HYDROCHLORIDE, AND DEXTROMETHORPHAN HYDROBROMIDE 2; 30; 10 MG/5ML; MG/5ML; MG/5ML
10 SYRUP ORAL 3 TIMES DAILY PRN
Qty: 200 ML | Refills: 0 | Status: SHIPPED | OUTPATIENT
Start: 2022-09-26 | End: 2023-01-31

## 2022-09-26 NOTE — PROGRESS NOTES
"Chief Complaint  Cough, URI, and Shortness of Breath    SUBJECTIVE  Michael Lor presents to [facility]     Patient complaining of shortness of breath, productive cough - green.  Symptoms worse with getting hot.  Patient denies fever.  Patient seen at [facility] Friday and diagnosed with Covid 19.  Patient was given Sudafed, with good control of symptoms as long as he doesn't miss a dose. Pt is c/o cough and shortness of breath and sore throat. He is taking the sudafed every 6 hours. He states his cough is productive. He is c/o  Shortness of breath at rest and with exertion.     History of Present Illness  Past Medical History:   Diagnosis Date   • Allergic    • Anxiety    • Depression       Family History   Problem Relation Age of Onset   • Diabetes Father       History reviewed. No pertinent surgical history.     Current Outpatient Medications:   •  sertraline (Zoloft) 25 MG tablet, Take 1 tablet by mouth Daily., Disp: 30 tablet, Rfl: 1  •  brompheniramine-pseudoephedrine-DM 30-2-10 MG/5ML syrup, Take 10 mL by mouth 3 (Three) Times a Day As Needed for Allergies., Disp: 200 mL, Rfl: 0    OBJECTIVE  Vital Signs:   /70 (BP Location: Left arm, Patient Position: Sitting, Cuff Size: Adult)   Pulse 84   Temp 98.2 °F (36.8 °C) (Oral)   Ht 177.8 cm (70\")   Wt 98.9 kg (218 lb 1.6 oz)   SpO2 96%   BMI 31.29 kg/m²    Estimated body mass index is 31.29 kg/m² as calculated from the following:    Height as of this encounter: 177.8 cm (70\").    Weight as of this encounter: 98.9 kg (218 lb 1.6 oz).     Wt Readings from Last 3 Encounters:   09/26/22 98.9 kg (218 lb 1.6 oz)   08/05/22 98.9 kg (218 lb)   05/24/22 94.3 kg (208 lb)     BP Readings from Last 3 Encounters:   09/26/22 112/70   08/05/22 119/75   05/24/22 135/74       Physical Exam  Vitals reviewed.   Constitutional:       Appearance: Normal appearance. He is well-developed.   HENT:      Head: Normocephalic and atraumatic.      " Right Ear: Hearing, tympanic membrane, ear canal and external ear normal.      Left Ear: Hearing, tympanic membrane, ear canal and external ear normal.      Mouth/Throat:      Pharynx: Posterior oropharyngeal erythema present. No oropharyngeal exudate.   Eyes:      Conjunctiva/sclera: Conjunctivae normal.      Pupils: Pupils are equal, round, and reactive to light.   Cardiovascular:      Rate and Rhythm: Normal rate and regular rhythm.      Heart sounds: No murmur heard.    No friction rub. No gallop.   Pulmonary:      Effort: Pulmonary effort is normal.      Breath sounds: Normal breath sounds. No wheezing or rhonchi.   Skin:     General: Skin is warm and dry.   Neurological:      Mental Status: He is alert and oriented to person, place, and time.      Cranial Nerves: No cranial nerve deficit.   Psychiatric:         Mood and Affect: Mood and affect normal.         Behavior: Behavior normal.         Thought Content: Thought content normal.         Judgment: Judgment normal.          Result Review        No Images in the past 120 days found..      The above data has been reviewed by JOSELUIS Pino 09/26/2022 13:23 EDT.          Patient Care Team:  Silke Berg APRN as PCP - General (Family Medicine)           ASSESSMENT & PLAN    Diagnoses and all orders for this visit:    1. Cough (Primary)  Comments:  Bromfed-DM 3 times daily as needed, side effects administration addressed.  DC Sudafed given at [facility].  Orders:  -     XR Chest PA & Lateral; Future  -     brompheniramine-pseudoephedrine-DM 30-2-10 MG/5ML syrup; Take 10 mL by mouth 3 (Three) Times a Day As Needed for Allergies.  Dispense: 200 mL; Refill: 0    2. Chest congestion  -     XR Chest PA & Lateral; Future  -     brompheniramine-pseudoephedrine-DM 30-2-10 MG/5ML syrup; Take 10 mL by mouth 3 (Three) Times a Day As Needed for Allergies.  Dispense: 200 mL; Refill: 0    3. Shortness of breath  -     XR Chest PA & Lateral; Future    4.  Anxiety  Comments:  Increase Zoloft to 50 mg daily, side effect administration addressed.         Tobacco Use: Low Risk    • Smoking Tobacco Use: Never Smoker   • Smokeless Tobacco Use: Never Used       Follow Up     Return in 6 weeks (on 11/7/2022), or if symptoms worsen or fail to improve.      Patient was given instructions and counseling regarding his condition or for health maintenance advice. Please see specific information pulled into the AVS if appropriate.   I have reviewed information obtained and documented by others and I have confirmed the accuracy of this documented note.    Silke Berg, APRN

## 2022-10-11 ENCOUNTER — OFFICE VISIT (OUTPATIENT)
Dept: NEUROSURGERY | Facility: CLINIC | Age: 27
End: 2022-10-11

## 2022-10-11 VITALS
BODY MASS INDEX: 31.94 KG/M2 | DIASTOLIC BLOOD PRESSURE: 87 MMHG | HEIGHT: 70 IN | WEIGHT: 223.1 LBS | SYSTOLIC BLOOD PRESSURE: 135 MMHG

## 2022-10-11 DIAGNOSIS — M51.37 DDD (DEGENERATIVE DISC DISEASE), LUMBOSACRAL: ICD-10-CM

## 2022-10-11 DIAGNOSIS — M54.42 ACUTE MIDLINE LOW BACK PAIN WITH LEFT-SIDED SCIATICA: Primary | ICD-10-CM

## 2022-10-11 PROCEDURE — 99213 OFFICE O/P EST LOW 20 MIN: CPT | Performed by: NEUROLOGICAL SURGERY

## 2022-10-11 NOTE — PROGRESS NOTES
Michael Horowitz is a 27 y.o. male that presents with Back Pain       He is overall about the same. He has mostly lower back pain. He has DDD at L5-S1. He has no notable leg pain.       Review of Systems   Musculoskeletal: Positive for back pain and myalgias.   Neurological: Positive for numbness.        Vitals:    10/11/22 0836   BP: 135/87        Physical Exam  Neurological:      Mental Status: He is alert.   Psychiatric:         Mood and Affect: Mood normal.             Assessment and Plan {CC Problem List  Visit Diagnosis  ROS  Review (Popup)  [facility]  Quality  BestPractice  Medications  SmartSets  SnapShot Encounters  Media :23}   Problem List Items Addressed This Visit    None  Visit Diagnoses     Acute midline low back pain with left-sided sciatica    -  Primary    DDD (degenerative disc disease), lumbosacral          He has DDD which is prone to flare up with strenuous work. I have recommended he continue with light duty at work. If this is not something that can be accommodated long term, I would recommend he find lighter duty employment.     I would try to avoid lumbar fusion, particularly in light of his young age. We discussed the importance of core strengthening, avoidance of activities that worsen the pain, nicotine avoidance and maintenance of healthy weight.     He could consider an LESB, but this would likely be temporary.      Follow Up {Instructions Charge Capture  Follow-up Communications :23}   No follow-ups on file.

## 2022-10-18 ENCOUNTER — TELEPHONE (OUTPATIENT)
Dept: FAMILY MEDICINE CLINIC | Facility: CLINIC | Age: 27
End: 2022-10-18

## 2023-01-12 ENCOUNTER — DOCUMENTATION (OUTPATIENT)
Dept: PHYSICAL THERAPY | Facility: CLINIC | Age: 28
End: 2023-01-12
Payer: COMMERCIAL

## 2023-01-12 NOTE — PROGRESS NOTES
Discharge Summary  Discharge Summary from Physical Therapy Report  [address] [place] [address]      Dates  PT visit: 6/3/22 - 7/19/22  Number of Visits: 10       . The patient complains of low back pain.  LTG 1: 12 weeks:  The patient will report a pain rating of 3/10 or better at worst in order to improve  tolerance to activities of daily living and improve sleep quality.  STATUS:  Not met  STG 1a: 6 weeks:  The patient will report a pain rating of 5/10 or better at its worst.  STATUS:  Not met  TREATMENT:  Therapeutic exercises, manual therapy, aquatic therapy, home exercise   instruction, and modalities as needed for pain to include:  electrical stimulation, moist heat, ice,   ultrasound, and diathermy.      2. The patient demonstrates weakness of the left hip.  LTG 2: 12 weeks:  The patient will demonstrate 5 /5 strength for left hip flexion, abduction,  and extension in order to improve hip stability.  STATUS:  Not met  STG 2a: 6 weeks:  The patient will demonstrate 4+ /5 strength for left hip flexion, abduction,  and extension.  STATUS:  Not met  TREATMENT: Therapeutic exercises, manual therapy, aquatic therapy, home exercise instruction,  and modalities as needed for pain to include:  electrical stimulation, moist heat, ice, ultrasound, and   diathermy.      4. The patient has limited lumbar AROM  LTG 4: 12 weeks:  The patient will demonstrate lumbar AROM as follows: 25 degrees of extension.  STATUS: MET  STG 4a: The patient will be independent with HEP.    STATUS:  Ongoing  TREATMENT: Manual therapy, therapeutic exercise, home exercise instruction, and modalities as needed to include: moist heat, electrical stimulation, and ultrasound.      5. Mobility: Walking/Moving Around Functional Limitation                   LTG 5: 12 weeks:  The patient will demonstrate 1-19 % limitation by achieving a score of 1-9 on the DONTRELL.  STATUS:  New  TREATMENT:  Manual therapy, therapeutic exercise, home exercise  instruction, and modalities as needed to include: moist heat, electrical stimulation, and ultrasound.      Goals: Partially Met    Discharge Plan: Continue with current home exercise program as instructed    Comments Pt to continue with HEP.    Date of Discharge 1/12/23        aNder Whitten PT  Physical Therapist    Electronically signed 1/12/2023    [place] License: PT - [id number]

## 2023-01-31 ENCOUNTER — OFFICE VISIT (OUTPATIENT)
Dept: FAMILY MEDICINE CLINIC | Facility: CLINIC | Age: 28
End: 2023-01-31
Payer: COMMERCIAL

## 2023-01-31 VITALS
TEMPERATURE: 97.7 F | SYSTOLIC BLOOD PRESSURE: 115 MMHG | DIASTOLIC BLOOD PRESSURE: 72 MMHG | BODY MASS INDEX: 31.61 KG/M2 | OXYGEN SATURATION: 98 % | HEIGHT: 70 IN | WEIGHT: 220.8 LBS | HEART RATE: 77 BPM

## 2023-01-31 DIAGNOSIS — K62.5 RECTAL BLEEDING: ICD-10-CM

## 2023-01-31 DIAGNOSIS — F32.A DEPRESSION, UNSPECIFIED DEPRESSION TYPE: ICD-10-CM

## 2023-01-31 DIAGNOSIS — G89.29 CHRONIC MIDLINE LOW BACK PAIN WITH LEFT-SIDED SCIATICA: Primary | ICD-10-CM

## 2023-01-31 DIAGNOSIS — M54.42 CHRONIC MIDLINE LOW BACK PAIN WITH LEFT-SIDED SCIATICA: Primary | ICD-10-CM

## 2023-01-31 DIAGNOSIS — L29.0 ANAL ITCHING: ICD-10-CM

## 2023-01-31 PROCEDURE — 99214 OFFICE O/P EST MOD 30 MIN: CPT | Performed by: NURSE PRACTITIONER

## 2023-01-31 RX ORDER — PRAMOXINE HYDROCHLORIDE HYDROCORTISONE ACETATE 100; 100 MG/10G; MG/10G
1 AEROSOL, FOAM TOPICAL 3 TIMES DAILY
Qty: 10 G | Refills: 2 | Status: SHIPPED | OUTPATIENT
Start: 2023-01-31

## 2023-01-31 NOTE — PROGRESS NOTES
Chief Complaint  Follow-up, Back Pain, Hemorrhoids, and Depression    SUBJECTIVE  Michael Horowitz presents to [facility]     Patient complaining of continuing low back pain.  Patient completed PT with no relief.  Patient consulted Dr. DEEPTI Tijerina, Neurosurgery as follows:  He has DDD which is prone to flare up with strenuous work. I have recommended he continue with light duty at work. If this is not something that can be accommodated long term, I would recommend he find lighter duty employment. I would try to avoid lumbar fusion, particularly in light of his young age. We discussed the importance of core strengthening, avoidance of activities that worsen the pain, nicotine avoidance and maintenance of healthy weight.  Patient describes pain as constant, radiates through left leg when sitting for too long, sharp, stabbing, dull, aching.  Patient rates pain at 7-8/10.  Patient notes his employer has given him until April to get off light duty in order to keep his job.    Depression:  Patient stopped taking Zoloft due to he felt like it was making his symptoms worse.  Patient is requesting to have App.netight testing completed.  Patient denies suicidal ideations or thoughts of harming himself or others. Pt states he has been off th medication for several months. He states he experiences depressive symptoms when he is by himself and alone with his thoughts. Pt is interested in starting medication again.     Patient complaining of hemorrhoids for several years.  He states he has itching, blood on tissue when he wipes.  Patient states he usually has normal bowel movements but did have 6 hours of diarrhea over the last weekend.  Patient has tried Preparation H OTC with no relief.  Patient states his sister had colonoscopy at age 15 for same symptoms and polyps were found.pt has tried preparation H without relief.     History of Present Illness  Past Medical History:   Diagnosis Date   •  "Allergic    • Anxiety    • Depression       Family History   Problem Relation Age of Onset   • Diabetes Father       History reviewed. No pertinent surgical history.     Current Outpatient Medications:   •  Hydrocort-Pramoxine, Perianal, (Proctofoam HC) 1-1 % rectal foam, Insert 1 application into the rectum 3 (Three) Times a Day., Disp: 10 g, Rfl: 2    OBJECTIVE  Vital Signs:   /72 (BP Location: Left arm, Patient Position: Sitting, Cuff Size: Large Adult)   Pulse 77   Temp 97.7 °F (36.5 °C) (Oral)   Ht 177.8 cm (70\")   Wt 100 kg (220 lb 12.8 oz)   SpO2 98%   BMI 31.68 kg/m²    Estimated body mass index is 31.68 kg/m² as calculated from the following:    Height as of this encounter: 177.8 cm (70\").    Weight as of this encounter: 100 kg (220 lb 12.8 oz).     Wt Readings from Last 3 Encounters:   01/31/23 100 kg (220 lb 12.8 oz)   10/11/22 101 kg (223 lb 1.6 oz)   09/26/22 98.9 kg (218 lb 1.6 oz)     BP Readings from Last 3 Encounters:   01/31/23 115/72   10/11/22 135/87   09/26/22 112/70       Physical Exam  Vitals reviewed.   Constitutional:       Appearance: Normal appearance. He is well-developed.   HENT:      Head: Normocephalic and atraumatic.      Right Ear: External ear normal.      Left Ear: External ear normal.      Mouth/Throat:      Pharynx: No oropharyngeal exudate.   Eyes:      Conjunctiva/sclera: Conjunctivae normal.      Pupils: Pupils are equal, round, and reactive to light.   Cardiovascular:      Rate and Rhythm: Normal rate and regular rhythm.      Pulses: Normal pulses.      Heart sounds: Normal heart sounds. No murmur heard.    No friction rub. No gallop.   Pulmonary:      Effort: Pulmonary effort is normal.      Breath sounds: Normal breath sounds. No wheezing or rhonchi.   Genitourinary:     Comments: Rectal exam-no external hemorrhoids visible  Musculoskeletal:      Lumbar back: Positive left straight leg raise test.   Skin:     General: Skin is warm and dry.   Neurological:      " Mental Status: He is alert and oriented to person, place, and time.      Cranial Nerves: No cranial nerve deficit.   Psychiatric:         Mood and Affect: Mood and affect normal.         Behavior: Behavior normal.         Thought Content: Thought content normal.         Judgment: Judgment normal.          Result Review        No Images in the past 120 days found..      The above data has been reviewed by JOSELUIS Pino 01/31/2023 10:21 EST.          Patient Care Team:  Silke Berg APRN as PCP - General (Family Medicine)    BMI is >= 30 and <35. (Class 1 Obesity). The following options were offered after discussion;: weight loss educational material (shared in after visit summary)       ASSESSMENT & PLAN    Diagnoses and all orders for this visit:    1. Chronic midline low back pain with left-sided sciatica (Primary)  -     Ambulatory Referral to Pain Management    2. Depression, unspecified depression type  -     GeneSight - Swab,; Future    3. Rectal bleeding  -     Ambulatory Referral to General Surgery    4. Anal itching  -     Hydrocort-Pramoxine, Perianal, (Proctofoam HC) 1-1 % rectal foam; Insert 1 application into the rectum 3 (Three) Times a Day.  Dispense: 10 g; Refill: 2         Tobacco Use: Low Risk    • Smoking Tobacco Use: Never   • Smokeless Tobacco Use: Never   • Passive Exposure: Not on file       Follow Up     Return if symptoms worsen or fail to improve.      Patient was given instructions and counseling regarding his condition or for health maintenance advice. Please see specific information pulled into the AVS if appropriate.   I have reviewed information obtained and documented by others and I have confirmed the accuracy of this documented note.    JOSELUIS Pino

## 2023-02-14 ENCOUNTER — OFFICE VISIT (OUTPATIENT)
Dept: NEUROSURGERY | Facility: CLINIC | Age: 28
End: 2023-02-14
Payer: COMMERCIAL

## 2023-02-14 VITALS — BODY MASS INDEX: 31.78 KG/M2 | WEIGHT: 222 LBS | HEIGHT: 70 IN

## 2023-02-14 DIAGNOSIS — M51.37 DDD (DEGENERATIVE DISC DISEASE), LUMBOSACRAL: Primary | ICD-10-CM

## 2023-02-14 PROCEDURE — 99212 OFFICE O/P EST SF 10 MIN: CPT | Performed by: NEUROLOGICAL SURGERY

## 2023-02-14 NOTE — PROGRESS NOTES
Michael Horowitz is a 27 y.o. male that presents with Back Pain       He was given until April to get off of light duty. He reports he is having intermittent back pain. It waxes and wanes. It tends to worsen with prolonged bending and standing.       Review of Systems   Musculoskeletal: Positive for back pain.         Physical Exam  Pulmonary:      Effort: Pulmonary effort is normal.   Neurological:      Mental Status: He is alert.           Assessment and Plan {CC Problem List  Visit Diagnosis  ROS  Review (Popup)  [facility] Maintenance  Quality  BestPractice  Medications  SmartSets  SnapShot Encounters  Media :23}   Problem List Items Addressed This Visit    None  Visit Diagnoses     DDD (degenerative disc disease), lumbosacral    -  Primary      He has lower back pain and some leg pain.    He has had a pain management referral.    I will release to full duty.     Follow Up {Instructions Charge Capture  Follow-up Communications :23}   Return if symptoms worsen or fail to improve.

## 2023-02-17 ENCOUNTER — OFFICE VISIT (OUTPATIENT)
Dept: SURGERY | Facility: CLINIC | Age: 28
End: 2023-02-17
Payer: COMMERCIAL

## 2023-02-17 ENCOUNTER — PREP FOR SURGERY (OUTPATIENT)
Dept: OTHER | Facility: HOSPITAL | Age: 28
End: 2023-02-17
Payer: COMMERCIAL

## 2023-02-17 VITALS — BODY MASS INDEX: 31.87 KG/M2 | HEIGHT: 70 IN | WEIGHT: 222.6 LBS

## 2023-02-17 DIAGNOSIS — K62.5 RECTAL BLEEDING: Primary | ICD-10-CM

## 2023-02-17 PROCEDURE — 99203 OFFICE O/P NEW LOW 30 MIN: CPT | Performed by: SURGERY

## 2023-02-17 NOTE — PROGRESS NOTES
"Inpatient History and Physical Surgical Orders    Preadmission Location:   Preadmission Time:  Facility:  Surgery Date:  Surgery Time:  Preadmission Test date:     Chief Complaint  Outpatient History and Physical / Surgical Orders    Primary Care Provider: Silke Berg APRN    Referring Provider: Silke Berg APRN    Subjective      Patient Name: Michael Horowitz : 1995    HPI  The patient is a 27-year-old gentleman who presents with some intermittent rectal bleeding.  He notes bleeding if he has more frequent bowel movements.  He does have a family history of colonic polyps.    Past History:  Medical History: has a past medical history of Allergic, Anxiety, Depression, and Lumbosacral disc disease (2022).   Surgical History: has no past surgical history on file.   Family History: family history includes Diabetes in his father.   Social History: reports that he has never smoked. He has never used smokeless tobacco. He reports that he does not currently use alcohol. He reports that he does not use drugs.  Allergies: Penicillins       Current Outpatient Medications:   •  Hydrocort-Pramoxine, Perianal, (Proctofoam HC) 1-1 % rectal foam, Insert 1 application into the rectum 3 (Three) Times a Day., Disp: 10 g, Rfl: 2       Objective   Vital Signs:   Ht 177.8 cm (70\")   Wt 101 kg (222 lb 9.6 oz)   BMI 31.94 kg/m²       Physical Exam  Vitals and nursing note reviewed.   Constitutional:       Appearance: Normal appearance. The patient is well-developed.   Cardiovascular:      Rate and Rhythm: Normal rate and regular rhythm.   Pulmonary:      Effort: Pulmonary effort is normal.      Breath sounds: Normal air entry.   Abdominal:      General: Bowel sounds are normal.      Palpations: Abdomen is soft.      Skin:     General: Skin is warm and dry.   Neurological:      Mental Status: The patient is alert and oriented to person, place, and time.      Motor: Motor function is intact.   Psychiatric:         Mood " and Affect: Mood normal.       Result Review :               Assessment and Plan   Diagnoses and all orders for this visit:    1. Rectal bleeding (Primary)    We will schedule him for a colonoscopy.  I have described the procedure to him as well as the risk and benefits and he is agreeable to proceeding.    I  Jose Miguel Yates MD  02/17/2023

## 2023-06-12 ENCOUNTER — PREP FOR SURGERY (OUTPATIENT)
Dept: SURGERY | Facility: CLINIC | Age: 28
End: 2023-06-12
Payer: COMMERCIAL

## 2023-06-12 DIAGNOSIS — K62.5 RECTAL BLEEDING: Primary | ICD-10-CM

## 2023-06-12 RX ORDER — SODIUM CHLORIDE 0.9 % (FLUSH) 0.9 %
3 SYRINGE (ML) INJECTION EVERY 12 HOURS SCHEDULED
OUTPATIENT
Start: 2023-06-12

## 2023-06-12 RX ORDER — SODIUM CHLORIDE 0.9 % (FLUSH) 0.9 %
10 SYRINGE (ML) INJECTION AS NEEDED
OUTPATIENT
Start: 2023-06-12

## 2023-06-12 RX ORDER — SODIUM CHLORIDE 9 MG/ML
40 INJECTION, SOLUTION INTRAVENOUS AS NEEDED
OUTPATIENT
Start: 2023-06-12

## 2023-06-15 ENCOUNTER — TELEPHONE (OUTPATIENT)
Dept: SURGERY | Facility: CLINIC | Age: 28
End: 2023-06-15
Payer: COMMERCIAL

## 2023-06-15 NOTE — TELEPHONE ENCOUNTER
LEFT MESSAGE FOR PATIENT TO CALL THE OFFICE. CALLING PATIENT WITH HIS ARRIVAL TIME OF 8 AM FOR HIS COLONOSCOPY ON 6/19/23, TO MAKE SURE HE HAS HIS PREP AND A .

## 2023-06-19 ENCOUNTER — TELEPHONE (OUTPATIENT)
Dept: SURGERY | Facility: CLINIC | Age: 28
End: 2023-06-19
Payer: COMMERCIAL

## 2023-06-19 NOTE — TELEPHONE ENCOUNTER
PT WAS A NO SHOW FOR HIS PROCEDURE TODAY.    Pt received from  RN. Pt alert, and oriented to person. Pt refuses to speak or cooperate. 1:1 at bedside for safety will continue to monitor.

## 2023-09-01 NOTE — PROGRESS NOTES
Physical Therapy Daily Treatment Note        Patient: Michael Horowitz   : 1995  Diagnosis/ICD-10 Code:  Low back pain, unspecified back pain laterality, unspecified chronicity, unspecified whether sciatica present [M54.50]  Referring practitioner: Duong Bazan MD  Date of Initial Visit: Type: THERAPY  Noted: 6/3/2022  Today's Date: 2022  Patient seen for 9 sessions             Subjective   Michael Horowitz reports: back feeling stiff, states that the pain isn't into his legs anymore.     Objective   No complaints of increased pain or discomfort.     See Exercise, Manual, and Modality Logs for complete treatment.       Assessment/Plan  Michael still experiencing increased low back  pain, although improved pain in his legs. Pt tolerated exercises well, no complaints of increased pain or discomfort. Pt would benefit from skilled PT to address Range of Motion  and Strength deficits, pain management and any concerns with ADLs.     Progress per Plan of Care           Timed:  Manual Therapy:         mins  28720;  Therapeutic Exercise:    10     mins  42532;     Neuromuscular Jose Luis:    10    mins  72067;    Therapeutic Activity:     10     mins  48802;     Gait Training:           mins  59196;    Aquatic Therapy:          mins  90609;       Untimed:  Electrical Stimulation:         mins  02047 ( );  Mechanical Traction:         mins  01024;       Timed Treatment:   30   mins   Total Treatment:     30   mins      Electronically signed:   Mariama Whitten PTA  Physical Therapist Assistant  [facility] License #: [id number]  
full range of motion in all extremities

## 2024-04-09 ENCOUNTER — OFFICE VISIT (OUTPATIENT)
Dept: FAMILY MEDICINE CLINIC | Facility: CLINIC | Age: 29
End: 2024-04-09
Payer: COMMERCIAL

## 2024-04-09 VITALS
HEART RATE: 77 BPM | OXYGEN SATURATION: 97 % | HEIGHT: 70 IN | DIASTOLIC BLOOD PRESSURE: 81 MMHG | WEIGHT: 226 LBS | BODY MASS INDEX: 32.35 KG/M2 | SYSTOLIC BLOOD PRESSURE: 127 MMHG

## 2024-04-09 DIAGNOSIS — Z00.00 ANNUAL PHYSICAL EXAM: Primary | ICD-10-CM

## 2024-04-09 DIAGNOSIS — F32.0 CURRENT MILD EPISODE OF MAJOR DEPRESSIVE DISORDER, UNSPECIFIED WHETHER RECURRENT: ICD-10-CM

## 2024-04-09 DIAGNOSIS — E53.8 FOLIC ACID DEFICIENCY: ICD-10-CM

## 2024-04-09 DIAGNOSIS — F41.9 ANXIETY: ICD-10-CM

## 2024-04-09 PROBLEM — E66.9 OBESITY (BMI 30-39.9): Status: ACTIVE | Noted: 2024-04-09

## 2024-04-09 PROCEDURE — 99395 PREV VISIT EST AGE 18-39: CPT | Performed by: NURSE PRACTITIONER

## 2024-04-09 PROCEDURE — 99214 OFFICE O/P EST MOD 30 MIN: CPT | Performed by: NURSE PRACTITIONER

## 2024-04-09 RX ORDER — FOLIC ACID 1 MG/1
1 TABLET ORAL DAILY
Qty: 90 TABLET | Refills: 1 | Status: SHIPPED | OUTPATIENT
Start: 2024-04-09

## 2024-04-09 RX ORDER — DESVENLAFAXINE 25 MG/1
25 TABLET, EXTENDED RELEASE ORAL DAILY
Qty: 90 TABLET | Refills: 0 | Status: SHIPPED | OUTPATIENT
Start: 2024-04-09

## 2024-04-09 NOTE — PROGRESS NOTES
Chief Complaint  Anxiety (Pt here to discuss genesight results and was taking zoloft but pt reports it was not helping at all. ) and Annual Exam    SUBJECTIVE  Michael Horowitz presents to [facility]    Annual physical exam with lab    Anxiety  Presents for initial visit. Onset was 6 to 12 months ago.  Symptoms include depressed mood, excessive worry, insomnia, nervous/anxious behavior, restlessness and suicidal ideas. Symptoms occur most days. The severity of symptoms is moderate. The symptoms are aggravated by family issues and work stress. The quality of sleep is poor. His past medical history is significant for depression. The treatment provided no relief.   Patient was previously on Zoloft in the past.  He has been off for approximately 1 year.  Patient states he is having worsening anxiety and depression, this is due to financial issues at home and stress.  He does say he goes to the motions of life.  He does not have a lot of get up and go.  He does have suicidal thoughts but no plan.  States he would not harm himself but sometimes thinks he would be better off if he were not here.    Patient previously had GeneSight swab completed and would like to review those results.    Patient notes he does have family history of bipolar.  He denies any manic or depressive episodes.  But he would like evaluation for bipolar disorder.  Past Medical History:   Diagnosis Date    Allergic     Anxiety     Depression     Lumbosacral disc disease 05/2022      Family History   Problem Relation Age of Onset    Diabetes Father       History reviewed. No pertinent surgical history.     Current Outpatient Medications:     Hydrocort-Pramoxine, Perianal, (Proctofoam HC) 1-1 % rectal foam, Insert 1 application into the rectum 3 (Three) Times a Day., Disp: 10 g, Rfl: 2    Desvenlafaxine Succinate ER (Pristiq) 25 MG tablet sustained-release 24 hour, Take 1 tablet by mouth Daily., Disp: 90 tablet, Rfl: 0     "folic acid (FOLVITE) 1 MG tablet, Take 1 tablet by mouth Daily., Disp: 90 tablet, Rfl: 1    OBJECTIVE  Vital Signs:   /81   Pulse 77   Ht 177.8 cm (70\")   Wt 103 kg (226 lb)   SpO2 97%   BMI 32.43 kg/m²    Estimated body mass index is 32.43 kg/m² as calculated from the following:    Height as of this encounter: 177.8 cm (70\").    Weight as of this encounter: 103 kg (226 lb).     Wt Readings from Last 3 Encounters:   04/09/24 103 kg (226 lb)   02/17/23 101 kg (222 lb 9.6 oz)   02/14/23 101 kg (222 lb)     BP Readings from Last 3 Encounters:   04/09/24 127/81   01/31/23 115/72   10/11/22 135/87       Physical Exam  Vitals reviewed.   Constitutional:       Appearance: Normal appearance. He is well-developed.   HENT:      Head: Normocephalic and atraumatic.      Right Ear: External ear normal.      Left Ear: External ear normal.      Mouth/Throat:      Pharynx: No oropharyngeal exudate.   Eyes:      Conjunctiva/sclera: Conjunctivae normal.      Pupils: Pupils are equal, round, and reactive to light.   Cardiovascular:      Rate and Rhythm: Normal rate and regular rhythm.      Pulses: Normal pulses.      Heart sounds: Normal heart sounds. No murmur heard.     No friction rub. No gallop.   Pulmonary:      Effort: Pulmonary effort is normal.      Breath sounds: Normal breath sounds. No wheezing or rhonchi.   Skin:     General: Skin is warm and dry.   Neurological:      Mental Status: He is alert and oriented to person, place, and time.      Cranial Nerves: No cranial nerve deficit.   Psychiatric:         Mood and Affect: Mood and affect normal.         Behavior: Behavior normal.         Thought Content: Thought content normal.         Judgment: Judgment normal.          Result Review        No Images in the past 120 days found..      The above data has been reviewed by JOSELUIS Pino 04/09/2024 12:48 EDT.          Patient Care Team:  Silke Berg APRN as PCP - General (Family Medicine)  Jose Miguel Yates " MD JUDE as Consulting Physician (General Surgery)    BMI is >= 30 and <35. (Class 1 Obesity). The following options were offered after discussion;: weight loss educational material (shared in after visit summary)       ASSESSMENT & PLAN    Diagnoses and all orders for this visit:    1. Annual physical exam (Primary)  -     Comprehensive Metabolic Panel; Future  -     CBC & Differential; Future  -     Lipid Panel; Future  -     TSH Rfx On Abnormal To Free T4; Future    2. Anxiety  Comments:  Reviewed GeneSight results with patient, trial of Pristiq daily, side effects and administration addressed.  Will refer to psychiatry for bipolar evaluation.  Orders:  -     Desvenlafaxine Succinate ER (Pristiq) 25 MG tablet sustained-release 24 hour; Take 1 tablet by mouth Daily.  Dispense: 90 tablet; Refill: 0    3. Current mild episode of major depressive disorder, unspecified whether recurrent  Comments:  Reviewed GeneSight results with patient, trial of Pristiq daily, side effects and administration addressed. Will refer to psychiatry for bipolar evaluation.  Orders:  -     Desvenlafaxine Succinate ER (Pristiq) 25 MG tablet sustained-release 24 hour; Take 1 tablet by mouth Daily.  Dispense: 90 tablet; Refill: 0  -     Ambulatory Referral to Psychiatry    4. Folic acid deficiency  -     folic acid (FOLVITE) 1 MG tablet; Take 1 tablet by mouth Daily.  Dispense: 90 tablet; Refill: 1       The patient is advised to continue current healthy lifestyle patterns and return for routine annual checkups.    Tobacco Use: Low Risk  (4/9/2024)    Patient History     Smoking Tobacco Use: Never     Smokeless Tobacco Use: Never     Passive Exposure: Not on file       Follow Up     Return in about 6 weeks (around 5/21/2024).      Patient was given instructions and counseling regarding his condition or for health maintenance advice. Please see specific information pulled into the AVS if appropriate.   I have reviewed information obtained and  documented by others and I have confirmed the accuracy of this documented note.    Silke Berg, APRN

## 2024-05-21 ENCOUNTER — OFFICE VISIT (OUTPATIENT)
Dept: FAMILY MEDICINE CLINIC | Facility: CLINIC | Age: 29
End: 2024-05-21
Payer: COMMERCIAL

## 2024-05-21 VITALS
BODY MASS INDEX: 31.44 KG/M2 | SYSTOLIC BLOOD PRESSURE: 147 MMHG | HEIGHT: 70 IN | DIASTOLIC BLOOD PRESSURE: 98 MMHG | HEART RATE: 83 BPM | OXYGEN SATURATION: 96 % | WEIGHT: 219.6 LBS

## 2024-05-21 DIAGNOSIS — F32.0 CURRENT MILD EPISODE OF MAJOR DEPRESSIVE DISORDER WITHOUT PRIOR EPISODE: ICD-10-CM

## 2024-05-21 DIAGNOSIS — F41.9 ANXIETY: Primary | ICD-10-CM

## 2024-05-21 PROCEDURE — 99213 OFFICE O/P EST LOW 20 MIN: CPT | Performed by: NURSE PRACTITIONER

## 2024-05-21 RX ORDER — DESVENLAFAXINE 25 MG/1
50 TABLET, EXTENDED RELEASE ORAL DAILY
Start: 2024-05-21

## 2024-05-21 NOTE — PROGRESS NOTES
"Chief Complaint  Follow-up, Anxiety, and Depression    SUBJECTIVE  Michael Horowitz presents to [facility] 6 week follow up with anxiety and depression. Pt is dong well with medication Prstiq 25 mg.  Patient is happy with the 25 mg dose, but does report he has some symptoms and episodes of feeling anxious.  Denies any suicidal thoughts or ideations.  Patient is also taking folic acid daily    History of Present Illness  Past Medical History:   Diagnosis Date    Allergic     Anxiety     Depression     Lumbosacral disc disease 05/2022      Family History   Problem Relation Age of Onset    Diabetes Father       History reviewed. No pertinent surgical history.     Current Outpatient Medications:     Desvenlafaxine Succinate ER (Pristiq) 25 MG tablet sustained-release 24 hour, Take 2 tablets by mouth Daily., Disp: , Rfl:     folic acid (FOLVITE) 1 MG tablet, Take 1 tablet by mouth Daily., Disp: 90 tablet, Rfl: 1    Hydrocort-Pramoxine, Perianal, (Proctofoam HC) 1-1 % rectal foam, Insert 1 application into the rectum 3 (Three) Times a Day., Disp: 10 g, Rfl: 2    OBJECTIVE  Vital Signs:   /98   Pulse 83   Ht 177.8 cm (70\")   Wt 99.6 kg (219 lb 9.6 oz)   SpO2 96%   BMI 31.51 kg/m²    Estimated body mass index is 31.51 kg/m² as calculated from the following:    Height as of this encounter: 177.8 cm (70\").    Weight as of this encounter: 99.6 kg (219 lb 9.6 oz).     Wt Readings from Last 3 Encounters:   05/21/24 99.6 kg (219 lb 9.6 oz)   04/09/24 103 kg (226 lb)   02/17/23 101 kg (222 lb 9.6 oz)     BP Readings from Last 3 Encounters:   05/21/24 147/98   04/09/24 127/81   01/31/23 115/72       Physical Exam  Vitals reviewed.   Constitutional:       Appearance: Normal appearance. He is well-developed.   HENT:      Head: Normocephalic and atraumatic.      Right Ear: External ear normal.      Left Ear: External ear normal.      Mouth/Throat:      Pharynx: No oropharyngeal exudate. "   Eyes:      Conjunctiva/sclera: Conjunctivae normal.      Pupils: Pupils are equal, round, and reactive to light.   Cardiovascular:      Rate and Rhythm: Normal rate and regular rhythm.      Pulses: Normal pulses.      Heart sounds: Normal heart sounds. No murmur heard.     No friction rub. No gallop.   Pulmonary:      Effort: Pulmonary effort is normal.      Breath sounds: Normal breath sounds. No wheezing or rhonchi.   Skin:     General: Skin is warm and dry.   Neurological:      Mental Status: He is alert and oriented to person, place, and time.      Cranial Nerves: No cranial nerve deficit.   Psychiatric:         Mood and Affect: Mood and affect normal.         Behavior: Behavior normal.         Thought Content: Thought content normal.         Judgment: Judgment normal.          Result Review        No Images in the past 120 days found..     The above data has been reviewed by JOSELUIS Pino 05/21/2024 10:42 EDT.          Patient Care Team:  Silke Berg APRN as PCP - General (Family Medicine)  Jose Miguel Yates MD as Consulting Physician (General Surgery)            ASSESSMENT & PLAN    Diagnoses and all orders for this visit:    1. Anxiety (Primary)  Comments:  Increase Pristiq to 50 mg daily, continue folic acid daily  Orders:  -     Desvenlafaxine Succinate ER (Pristiq) 25 MG tablet sustained-release 24 hour; Take 2 tablets by mouth Daily.    2. Current mild episode of major depressive disorder without prior episode  Comments:  Increase Pristiq to 50 mg daily, continue folic acid daily  Orders:  -     Desvenlafaxine Succinate ER (Pristiq) 25 MG tablet sustained-release 24 hour; Take 2 tablets by mouth Daily.         Tobacco Use: Low Risk  (5/21/2024)    Patient History     Smoking Tobacco Use: Never     Smokeless Tobacco Use: Never     Passive Exposure: Not on file       Follow Up     Return in about 2 months (around 7/21/2024).        Patient was given instructions and counseling regarding his  condition or for health maintenance advice. Please see specific information pulled into the AVS if appropriate.   I have reviewed information obtained and documented by others and I have confirmed the accuracy of this documented note.    JOSELUIS Pino

## 2024-05-23 ENCOUNTER — TELEPHONE (OUTPATIENT)
Dept: FAMILY MEDICINE CLINIC | Facility: CLINIC | Age: 29
End: 2024-05-23
Payer: COMMERCIAL

## 2024-07-22 ENCOUNTER — OFFICE VISIT (OUTPATIENT)
Dept: FAMILY MEDICINE CLINIC | Facility: CLINIC | Age: 29
End: 2024-07-22
Payer: COMMERCIAL

## 2024-07-22 VITALS
TEMPERATURE: 97.9 F | WEIGHT: 227.2 LBS | HEART RATE: 77 BPM | HEIGHT: 70 IN | DIASTOLIC BLOOD PRESSURE: 68 MMHG | SYSTOLIC BLOOD PRESSURE: 111 MMHG | BODY MASS INDEX: 32.53 KG/M2 | OXYGEN SATURATION: 98 %

## 2024-07-22 DIAGNOSIS — F32.0 CURRENT MILD EPISODE OF MAJOR DEPRESSIVE DISORDER WITHOUT PRIOR EPISODE: ICD-10-CM

## 2024-07-22 DIAGNOSIS — F41.9 ANXIETY: ICD-10-CM

## 2024-07-22 PROCEDURE — 99213 OFFICE O/P EST LOW 20 MIN: CPT | Performed by: NURSE PRACTITIONER

## 2024-07-22 RX ORDER — DESVENLAFAXINE 25 MG/1
50 TABLET, EXTENDED RELEASE ORAL DAILY
Qty: 60 TABLET | Refills: 5 | Status: SHIPPED | OUTPATIENT
Start: 2024-07-22

## 2024-07-22 NOTE — PROGRESS NOTES
"Chief Complaint  Follow-up (6 weeks), Anxiety, and Depression    SUBJECTIVE  Michael Horowitz presents to [facility]    Anxiety/Depression:  Patient is taking Pristiq - dose increased at last office visit, Folic  Acid, with good control of symptoms.  Patient denies suicidal ideations or thoughts of harming himself or others.  Patient reports he is doing well on medication.pt states his anxiety is much improved.   History of Present Illness  Past Medical History:   Diagnosis Date    Allergic     Anxiety     Depression     Lumbosacral disc disease 05/2022      Family History   Problem Relation Age of Onset    Diabetes Father       History reviewed. No pertinent surgical history.     Current Outpatient Medications:     Desvenlafaxine Succinate ER (Pristiq) 25 MG tablet sustained-release 24 hour, Take 2 tablets by mouth Daily., Disp: 60 tablet, Rfl: 5    folic acid (FOLVITE) 1 MG tablet, Take 1 tablet by mouth Daily., Disp: 90 tablet, Rfl: 1    OBJECTIVE  Vital Signs:   /68 (BP Location: Left arm, Patient Position: Sitting, Cuff Size: Large Adult)   Pulse 77   Temp 97.9 °F (36.6 °C) (Temporal)   Ht 177.8 cm (70\")   Wt 103 kg (227 lb 3.2 oz)   SpO2 98%   BMI 32.60 kg/m²    Estimated body mass index is 32.6 kg/m² as calculated from the following:    Height as of this encounter: 177.8 cm (70\").    Weight as of this encounter: 103 kg (227 lb 3.2 oz).     Wt Readings from Last 3 Encounters:   07/22/24 103 kg (227 lb 3.2 oz)   05/21/24 99.6 kg (219 lb 9.6 oz)   04/09/24 103 kg (226 lb)     BP Readings from Last 3 Encounters:   07/22/24 111/68   05/21/24 147/98   04/09/24 127/81       Physical Exam  Vitals reviewed.   Constitutional:       Appearance: Normal appearance. He is well-developed.   HENT:      Head: Normocephalic and atraumatic.      Right Ear: External ear normal.      Left Ear: External ear normal.      Mouth/Throat:      Pharynx: No oropharyngeal exudate.   Eyes:      " Conjunctiva/sclera: Conjunctivae normal.      Pupils: Pupils are equal, round, and reactive to light.   Cardiovascular:      Rate and Rhythm: Normal rate and regular rhythm.      Pulses: Normal pulses.      Heart sounds: Normal heart sounds. No murmur heard.     No friction rub. No gallop.   Pulmonary:      Effort: Pulmonary effort is normal.      Breath sounds: Normal breath sounds. No wheezing or rhonchi.   Skin:     General: Skin is warm and dry.   Neurological:      Mental Status: He is alert and oriented to person, place, and time.      Cranial Nerves: No cranial nerve deficit.   Psychiatric:         Mood and Affect: Mood and affect normal.         Behavior: Behavior normal.         Thought Content: Thought content normal.         Judgment: Judgment normal.          Result Review        No Images in the past 120 days found..      The above data has been reviewed by JOSELUIS Pino 07/22/2024 11:36 EDT.          Patient Care Team:  Silke Berg APRN as PCP - General (Family Medicine)  Jose Miguel Yates MD as Consulting Physician (General Surgery)            ASSESSMENT & PLAN    Diagnoses and all orders for this visit:    1. Anxiety  Comments:  Doing well on current dose of Pristiq 50 mg, continue medication  Orders:  -     Desvenlafaxine Succinate ER (Pristiq) 25 MG tablet sustained-release 24 hour; Take 2 tablets by mouth Daily.  Dispense: 60 tablet; Refill: 5    2. Current mild episode of major depressive disorder without prior episode  Comments:  Doing well on current dose of Pristiq 50 mg, continue medication  Orders:  -     Desvenlafaxine Succinate ER (Pristiq) 25 MG tablet sustained-release 24 hour; Take 2 tablets by mouth Daily.  Dispense: 60 tablet; Refill: 5         Tobacco Use: Low Risk  (7/22/2024)    Patient History     Smoking Tobacco Use: Never     Smokeless Tobacco Use: Never     Passive Exposure: Not on file       Follow Up     Return in about 4 months (around 11/22/2024).      Patient  was given instructions and counseling regarding his condition or for health maintenance advice. Please see specific information pulled into the AVS if appropriate.   I have reviewed information obtained and documented by others and I have confirmed the accuracy of this documented note.    Silke Berg, APRN

## 2024-11-06 ENCOUNTER — OFFICE VISIT (OUTPATIENT)
Dept: FAMILY MEDICINE CLINIC | Facility: CLINIC | Age: 29
End: 2024-11-06
Payer: COMMERCIAL

## 2024-11-06 VITALS
HEIGHT: 70 IN | OXYGEN SATURATION: 98 % | WEIGHT: 215.2 LBS | HEART RATE: 90 BPM | DIASTOLIC BLOOD PRESSURE: 71 MMHG | BODY MASS INDEX: 30.81 KG/M2 | SYSTOLIC BLOOD PRESSURE: 114 MMHG | TEMPERATURE: 97.3 F

## 2024-11-06 DIAGNOSIS — F34.1 PERSISTENT DEPRESSIVE DISORDER: ICD-10-CM

## 2024-11-06 DIAGNOSIS — F41.9 ANXIETY: Primary | ICD-10-CM

## 2024-11-06 PROCEDURE — 99214 OFFICE O/P EST MOD 30 MIN: CPT | Performed by: NURSE PRACTITIONER

## 2024-11-06 NOTE — PROGRESS NOTES
"Chief Complaint      Follow-up (4 months), Anxiety, and Depression    SUBJECTIVE  Michael Horowitz presents to [facility]    Anxiety/Depression:  Patient is taking Pristiq, Folic Acid, with good control of symptoms.  Patient admits to thoughts of self harm, thoughts of wanting to die but denies having a plan or wanting to commit suicide.  Patient requesting short term disability for depression.  Patient requesting referral to psych.  Patient states he is just very tired and frustrated with his job and very tired when he comes home from work.  States he would not actually kill himself, just states he sometimes thinks it would be better off if he were not here.    History of Present Illness  Past Medical History:   Diagnosis Date    Allergic     Anxiety     Depression     Lumbosacral disc disease 05/2022      Family History   Problem Relation Age of Onset    Diabetes Father       History reviewed. No pertinent surgical history.     Current Outpatient Medications:     Desvenlafaxine Succinate ER (Pristiq) 25 MG tablet sustained-release 24 hour, Take 2 tablets by mouth Daily., Disp: 60 tablet, Rfl: 5    folic acid (FOLVITE) 1 MG tablet, Take 1 tablet by mouth Daily., Disp: 90 tablet, Rfl: 1    OBJECTIVE  Vital Signs:   /71 (BP Location: Left arm, Patient Position: Sitting, Cuff Size: Large Adult)   Pulse 90   Temp 97.3 °F (36.3 °C) (Temporal)   Ht 177.8 cm (70\")   Wt 97.6 kg (215 lb 3.2 oz)   SpO2 98%   BMI 30.88 kg/m²    Estimated body mass index is 30.88 kg/m² as calculated from the following:    Height as of this encounter: 177.8 cm (70\").    Weight as of this encounter: 97.6 kg (215 lb 3.2 oz).     Wt Readings from Last 3 Encounters:   11/06/24 97.6 kg (215 lb 3.2 oz)   07/22/24 103 kg (227 lb 3.2 oz)   05/21/24 99.6 kg (219 lb 9.6 oz)     BP Readings from Last 3 Encounters:   11/06/24 114/71   07/22/24 111/68   05/21/24 147/98       Physical Exam  Vitals reviewed. "   Constitutional:       Appearance: Normal appearance. He is well-developed.   HENT:      Head: Normocephalic and atraumatic.      Right Ear: External ear normal.      Left Ear: External ear normal.      Mouth/Throat:      Pharynx: No oropharyngeal exudate.   Eyes:      Conjunctiva/sclera: Conjunctivae normal.      Pupils: Pupils are equal, round, and reactive to light.   Cardiovascular:      Rate and Rhythm: Normal rate and regular rhythm.      Pulses: Normal pulses.      Heart sounds: Normal heart sounds. No murmur heard.     No friction rub. No gallop.   Pulmonary:      Effort: Pulmonary effort is normal.      Breath sounds: Normal breath sounds. No wheezing or rhonchi.   Skin:     General: Skin is warm and dry.   Neurological:      Mental Status: He is alert and oriented to person, place, and time.      Cranial Nerves: No cranial nerve deficit.   Psychiatric:         Mood and Affect: Mood and affect normal.         Behavior: Behavior normal.         Thought Content: Thought content normal.         Judgment: Judgment normal.          Result Review        No Images in the past 120 days found..      The above data has been reviewed by JOSELUIS Pino 11/06/2024 15:34 EST.          Patient Care Team:  Silke Berg APRN as PCP - General (Family Medicine)  Jose Miguel Yates MD as Consulting Physician (General Surgery)            ASSESSMENT & PLAN    Diagnoses and all orders for this visit:    1. Anxiety (Primary)    2. Persistent depressive disorder    Patient has been referred to psychiatric provider, paper referral placed, provider notified, patient given provider number to call for an appointment if has not heard from provider office.  Patient to continue current medication for now.  Patient given copy of Nimbix results to take to psychiatric provider.    Tobacco Use: Low Risk  (11/6/2024)    Patient History     Smoking Tobacco Use: Never     Smokeless Tobacco Use: Never     Passive Exposure: Not on  file       Follow Up     Return if symptoms worsen or fail to improve.      Patient was given instructions and counseling regarding his condition or for health maintenance advice. Please see specific information pulled into the AVS if appropriate.   I have reviewed information obtained and documented by others and I have confirmed the accuracy of this documented note.    Silke Berg, APRN